# Patient Record
Sex: MALE | Race: WHITE | Employment: FULL TIME | URBAN - METROPOLITAN AREA
[De-identification: names, ages, dates, MRNs, and addresses within clinical notes are randomized per-mention and may not be internally consistent; named-entity substitution may affect disease eponyms.]

---

## 2017-03-29 ENCOUNTER — APPOINTMENT (OUTPATIENT)
Dept: OTHER | Facility: HOSPITAL | Age: 57
End: 2017-03-29
Attending: EMERGENCY MEDICINE

## 2017-10-12 ENCOUNTER — APPOINTMENT (OUTPATIENT)
Dept: GENERAL RADIOLOGY | Age: 57
End: 2017-10-12
Attending: NURSE PRACTITIONER
Payer: COMMERCIAL

## 2017-10-12 ENCOUNTER — HOSPITAL ENCOUNTER (OUTPATIENT)
Age: 57
Discharge: HOME | End: 2017-10-12
Payer: COMMERCIAL

## 2017-10-12 VITALS
BODY MASS INDEX: 37.03 KG/M2 | RESPIRATION RATE: 20 BRPM | WEIGHT: 250 LBS | SYSTOLIC BLOOD PRESSURE: 118 MMHG | DIASTOLIC BLOOD PRESSURE: 84 MMHG | HEIGHT: 69 IN | OXYGEN SATURATION: 98 % | HEART RATE: 88 BPM | TEMPERATURE: 98 F

## 2017-10-12 DIAGNOSIS — J40 BRONCHITIS: Primary | ICD-10-CM

## 2017-10-12 PROCEDURE — 99214 OFFICE O/P EST MOD 30 MIN: CPT

## 2017-10-12 PROCEDURE — 99204 OFFICE O/P NEW MOD 45 MIN: CPT

## 2017-10-12 PROCEDURE — 71020 XR CHEST PA + LAT CHEST (CPT=71020): CPT | Performed by: NURSE PRACTITIONER

## 2017-10-12 PROCEDURE — 94640 AIRWAY INHALATION TREATMENT: CPT

## 2017-10-12 RX ORDER — DOXYCYCLINE HYCLATE 100 MG/1
100 CAPSULE ORAL 2 TIMES DAILY
Qty: 20 CAPSULE | Refills: 0 | Status: SHIPPED | OUTPATIENT
Start: 2017-10-12 | End: 2017-10-22

## 2017-10-12 RX ORDER — PREDNISONE 20 MG/1
40 TABLET ORAL DAILY
Qty: 10 TABLET | Refills: 0 | Status: SHIPPED | OUTPATIENT
Start: 2017-10-12 | End: 2017-10-17

## 2017-10-12 RX ORDER — IPRATROPIUM BROMIDE AND ALBUTEROL SULFATE 2.5; .5 MG/3ML; MG/3ML
3 SOLUTION RESPIRATORY (INHALATION) ONCE
Status: COMPLETED | OUTPATIENT
Start: 2017-10-12 | End: 2017-10-12

## 2017-10-12 RX ORDER — ALBUTEROL SULFATE 90 UG/1
2 AEROSOL, METERED RESPIRATORY (INHALATION) EVERY 4 HOURS PRN
Qty: 1 INHALER | Refills: 0 | Status: SHIPPED | OUTPATIENT
Start: 2017-10-12 | End: 2017-11-11

## 2017-10-12 NOTE — ED INITIAL ASSESSMENT (HPI)
PATIENT WITH COUGH SINCE Saturday. REPORTS HE HAS FELT FEVERISH AT HOME. STATES COUGH IS PRODUCTIVE WITH YELLOW SPUTUM. DENIES HISTORY OF ASTHMA.

## 2017-10-12 NOTE — ED PROVIDER NOTES
Patient presents with:  Cough/URI      HPI:     Mckay Ortiz is a 61year old male who presents for evaluation and management of a chief complaint of productive cough with green and yellow sputum for the past 4 days.   The patient denies any chest pain or sh turbinates: clear drainage  THROAT: clear, without exudates, uvula midline and airway patent  LUNGS: End expiratory wheeze bilaterally. Nonlabored respirations. No rhonchi or rales.     MDM/Assessment/Plan:   Orders for this encounter:    Orders Placed Th today.    Follow Up with:  Mohit Paredes, MD [address] [address] [phone]    Schedule an appointment as soon as possible for a visit in 2 days

## 2018-05-30 PROCEDURE — 87086 URINE CULTURE/COLONY COUNT: CPT | Performed by: EMERGENCY MEDICINE

## 2018-05-31 ENCOUNTER — HOSPITAL ENCOUNTER (INPATIENT)
Facility: HOSPITAL | Age: 58
LOS: 6 days | Discharge: HOME | DRG: 194 | End: 2018-06-06
Attending: EMERGENCY MEDICINE | Admitting: HOSPITALIST
Payer: COMMERCIAL

## 2018-05-31 DIAGNOSIS — E87.1 HYPONATREMIA: ICD-10-CM

## 2018-05-31 DIAGNOSIS — R74.8 ELEVATED LIVER ENZYMES: ICD-10-CM

## 2018-05-31 DIAGNOSIS — J18.0 BRONCHOPNEUMONIA: Primary | ICD-10-CM

## 2018-05-31 PROCEDURE — 87449 NOS EACH ORGANISM AG IA: CPT | Performed by: EMERGENCY MEDICINE

## 2018-05-31 PROCEDURE — 87040 BLOOD CULTURE FOR BACTERIA: CPT | Performed by: EMERGENCY MEDICINE

## 2018-05-31 PROCEDURE — 83605 ASSAY OF LACTIC ACID: CPT | Performed by: EMERGENCY MEDICINE

## 2018-05-31 PROCEDURE — 81001 URINALYSIS AUTO W/SCOPE: CPT | Performed by: EMERGENCY MEDICINE

## 2018-05-31 PROCEDURE — 85025 COMPLETE CBC W/AUTO DIFF WBC: CPT | Performed by: EMERGENCY MEDICINE

## 2018-05-31 PROCEDURE — 80053 COMPREHEN METABOLIC PANEL: CPT | Performed by: EMERGENCY MEDICINE

## 2018-05-31 PROCEDURE — 99285 EMERGENCY DEPT VISIT HI MDM: CPT

## 2018-05-31 PROCEDURE — 94640 AIRWAY INHALATION TREATMENT: CPT

## 2018-05-31 PROCEDURE — 96375 TX/PRO/DX INJ NEW DRUG ADDON: CPT

## 2018-05-31 PROCEDURE — 36415 COLL VENOUS BLD VENIPUNCTURE: CPT

## 2018-05-31 PROCEDURE — 96367 TX/PROPH/DG ADDL SEQ IV INF: CPT

## 2018-05-31 PROCEDURE — 96365 THER/PROPH/DIAG IV INF INIT: CPT

## 2018-05-31 PROCEDURE — 84145 PROCALCITONIN (PCT): CPT | Performed by: EMERGENCY MEDICINE

## 2018-05-31 PROCEDURE — 96361 HYDRATE IV INFUSION ADD-ON: CPT

## 2018-05-31 RX ORDER — SODIUM CHLORIDE 9 MG/ML
INJECTION, SOLUTION INTRAVENOUS CONTINUOUS
Status: DISCONTINUED | OUTPATIENT
Start: 2018-06-01 | End: 2018-06-01

## 2018-05-31 RX ORDER — IBUPROFEN 800 MG/1
800 TABLET ORAL ONCE
Status: COMPLETED | OUTPATIENT
Start: 2018-05-31 | End: 2018-05-31

## 2018-05-31 RX ORDER — IPRATROPIUM BROMIDE AND ALBUTEROL SULFATE 2.5; .5 MG/3ML; MG/3ML
3 SOLUTION RESPIRATORY (INHALATION) ONCE
Status: COMPLETED | OUTPATIENT
Start: 2018-05-31 | End: 2018-05-31

## 2018-05-31 RX ORDER — TRAMADOL HYDROCHLORIDE 50 MG/1
50 TABLET ORAL ONCE
Status: COMPLETED | OUTPATIENT
Start: 2018-05-31 | End: 2018-05-31

## 2018-05-31 RX ORDER — ACETAMINOPHEN 500 MG
1000 TABLET ORAL EVERY 6 HOURS PRN
Status: DISCONTINUED | OUTPATIENT
Start: 2018-05-31 | End: 2018-06-01

## 2018-05-31 RX ORDER — CODEINE PHOSPHATE AND GUAIFENESIN 10; 100 MG/5ML; MG/5ML
5 SOLUTION ORAL ONCE
Status: COMPLETED | OUTPATIENT
Start: 2018-05-31 | End: 2018-05-31

## 2018-05-31 RX ORDER — METHYLPREDNISOLONE SODIUM SUCCINATE 40 MG/ML
40 INJECTION, POWDER, LYOPHILIZED, FOR SOLUTION INTRAMUSCULAR; INTRAVENOUS ONCE
Status: COMPLETED | OUTPATIENT
Start: 2018-05-31 | End: 2018-05-31

## 2018-05-31 NOTE — ED INITIAL ASSESSMENT (HPI)
Patient has a fever. Primary sent him to be admitted. Diagnosed yesterday and being treated for possible pneumonia.

## 2018-06-01 PROCEDURE — 87486 CHLMYD PNEUM DNA AMP PROBE: CPT | Performed by: NURSE PRACTITIONER

## 2018-06-01 PROCEDURE — 87633 RESP VIRUS 12-25 TARGETS: CPT | Performed by: NURSE PRACTITIONER

## 2018-06-01 PROCEDURE — 94640 AIRWAY INHALATION TREATMENT: CPT

## 2018-06-01 PROCEDURE — 87581 M.PNEUMON DNA AMP PROBE: CPT | Performed by: NURSE PRACTITIONER

## 2018-06-01 PROCEDURE — A4216 STERILE WATER/SALINE, 10 ML: HCPCS | Performed by: HOSPITALIST

## 2018-06-01 PROCEDURE — 87798 DETECT AGENT NOS DNA AMP: CPT | Performed by: NURSE PRACTITIONER

## 2018-06-01 PROCEDURE — 85025 COMPLETE CBC W/AUTO DIFF WBC: CPT | Performed by: HOSPITALIST

## 2018-06-01 PROCEDURE — 80048 BASIC METABOLIC PNL TOTAL CA: CPT | Performed by: HOSPITALIST

## 2018-06-01 PROCEDURE — 80076 HEPATIC FUNCTION PANEL: CPT | Performed by: NURSE PRACTITIONER

## 2018-06-01 RX ORDER — ACETAMINOPHEN 325 MG/1
650 TABLET ORAL EVERY 6 HOURS PRN
Status: DISCONTINUED | OUTPATIENT
Start: 2018-06-01 | End: 2018-06-06

## 2018-06-01 RX ORDER — ATORVASTATIN CALCIUM 40 MG/1
40 TABLET, FILM COATED ORAL DAILY
Status: DISCONTINUED | OUTPATIENT
Start: 2018-06-01 | End: 2018-06-01

## 2018-06-01 RX ORDER — IPRATROPIUM BROMIDE AND ALBUTEROL SULFATE 2.5; .5 MG/3ML; MG/3ML
3 SOLUTION RESPIRATORY (INHALATION) EVERY 4 HOURS PRN
Status: DISCONTINUED | OUTPATIENT
Start: 2018-06-01 | End: 2018-06-01

## 2018-06-01 RX ORDER — CODEINE PHOSPHATE AND GUAIFENESIN 10; 100 MG/5ML; MG/5ML
10 SOLUTION ORAL EVERY 6 HOURS PRN
Status: DISCONTINUED | OUTPATIENT
Start: 2018-06-01 | End: 2018-06-06

## 2018-06-01 RX ORDER — SODIUM CHLORIDE 0.9 % (FLUSH) 0.9 %
3 SYRINGE (ML) INJECTION AS NEEDED
Status: DISCONTINUED | OUTPATIENT
Start: 2018-06-01 | End: 2018-06-06

## 2018-06-01 RX ORDER — HEPARIN SODIUM 5000 [USP'U]/ML
5000 INJECTION, SOLUTION INTRAVENOUS; SUBCUTANEOUS EVERY 8 HOURS SCHEDULED
Status: DISCONTINUED | OUTPATIENT
Start: 2018-06-01 | End: 2018-06-06

## 2018-06-01 RX ORDER — ONDANSETRON 2 MG/ML
4 INJECTION INTRAMUSCULAR; INTRAVENOUS EVERY 6 HOURS PRN
Status: DISCONTINUED | OUTPATIENT
Start: 2018-06-01 | End: 2018-06-06

## 2018-06-01 RX ORDER — BISACODYL 10 MG
10 SUPPOSITORY, RECTAL RECTAL
Status: DISCONTINUED | OUTPATIENT
Start: 2018-06-01 | End: 2018-06-06

## 2018-06-01 RX ORDER — DOCUSATE SODIUM 100 MG/1
100 CAPSULE, LIQUID FILLED ORAL 2 TIMES DAILY
Status: DISCONTINUED | OUTPATIENT
Start: 2018-06-01 | End: 2018-06-06

## 2018-06-01 RX ORDER — METOCLOPRAMIDE HYDROCHLORIDE 5 MG/ML
10 INJECTION INTRAMUSCULAR; INTRAVENOUS EVERY 8 HOURS PRN
Status: DISCONTINUED | OUTPATIENT
Start: 2018-06-01 | End: 2018-06-06

## 2018-06-01 RX ORDER — POLYETHYLENE GLYCOL 3350 17 G/17G
17 POWDER, FOR SOLUTION ORAL DAILY PRN
Status: DISCONTINUED | OUTPATIENT
Start: 2018-06-01 | End: 2018-06-06

## 2018-06-01 RX ORDER — IPRATROPIUM BROMIDE AND ALBUTEROL SULFATE 2.5; .5 MG/3ML; MG/3ML
3 SOLUTION RESPIRATORY (INHALATION)
Status: DISCONTINUED | OUTPATIENT
Start: 2018-06-01 | End: 2018-06-04

## 2018-06-01 RX ORDER — SODIUM PHOSPHATE, DIBASIC AND SODIUM PHOSPHATE, MONOBASIC 7; 19 G/133ML; G/133ML
1 ENEMA RECTAL ONCE AS NEEDED
Status: DISCONTINUED | OUTPATIENT
Start: 2018-06-01 | End: 2018-06-06

## 2018-06-01 NOTE — PROGRESS NOTES
[facility] Pharmacy Note: Antimicrobial Weight Dose Adjustment for: {CefTRIAXone Sodium (ROCEPHIN) 1 g in sodium chloride 0.9 % 100 mL    Mckay Ortiz is a 61year old male who has been prescribed CefTRIAXone Sodium (ROCEPHIN) 1 g in sodium chloride 0.9 % 100 mL X

## 2018-06-01 NOTE — PLAN OF CARE
Problem: Patient Centered Care  Goal: Patient preferences are identified and integrated in the patient's plan of care  Interventions:  - What would you like us to know as we care for you?  Keep patient and wife involved in care  - Provide timely, complete, Anticipate increased pain with activity and pre-medicate as appropriate   Outcome: Progressing  patient denies pain for this RN    Problem: RISK FOR INFECTION - ADULT  Goal: Absence of fever/infection during anticipated neutropenic period  INTERVENTIONS  - ability or social support system   Outcome: Progressing  No home needs identified at this time.     Problem: SKIN/TISSUE INTEGRITY - ADULT  Goal: Skin integrity remains intact  INTERVENTIONS  - Assess and document risk factors for pressure ulcer development

## 2018-06-01 NOTE — CONSULTS
Pulmonary H&P/Consult     NAME: Bindu Guthrie - ROOM: 657/771-L - MRN: [id number] - Age: 61year old - :  10/20/1960    Date of Admission: 2018  6:55 PM  Admission Diagnosis: Hyponatremia [E87.1]  Bronchopneumonia [J18.0]    Assessment/Plan:  1.  Feve unspecified site    • Other and unspecified hyperlipidemia    Past Surgical History:  11/2015: COLONOSCOPY      Comment: adenomas, diverticulosis.  repeat 3 years  7/17/09: COLONOSCOPY,BIOPSY      Comment: Performed by Tara [facility] at [place] [address] tenderness or deformity. Heart: Regular rate and rhythm, normal S1S2, no murmur. Abdomen: soft, non-tender, non-distended, positive BS. Extremity: no edema    LABS/STUDIES: Reviewed in EPIC  Recent Labs   Lab  06/01/18   0529   RBC  3.86*   HGB  12. 3

## 2018-06-01 NOTE — H&P
[facility] Hospitalist Team  History and Physical     ASSESSMENT / PLAN:   46 yo male with hx obesity-BMI 44, seasonal allergies, HL, renal stone, recent tx for bronchitis/URI as well as  PNA with abnl CT chest who presents with ongoing cough, fever, malaise.  Pt bronchitis/URI as well as  PNA with abnl CT chest who presents with ongoing cough, fever, malaise. Pt states he has been feeling poorly since beginning of May.  He was having issues with productive cough and other URI like symptoms and was tx with 10 day Allergic rhinitis     hay fever   • Kidney stone    • Osteoarthrosis, unspecified whether generalized or localized, unspecified site    • Other and unspecified hyperlipidemia         PSH  Past Surgical History:  11/2015: COLONOSCOPY      Comment: adenomas, Onset   • Diabetes Father    • Hypertension Mother        Review of Systems  A comprehensive 10 point review of systems was completed. Pertinent positives and negatives noted in the the HPI.       DIAGNOSTIC DATA:   CBC/Chem  Recent Labs   Lab  05/30/18 Resp 18   Ht 5' 9\" (1.753 m)   Wt 264 lb 8.8 oz (120 kg)   SpO2 98%   BMI 39.07 kg/m²      Gen: No acute distress, alert and oriented x3  Neck Supple, no JVD  Pulm: Lungs rhonchi noted in lower left lung, some occasional wheezing  CV: Heart with regular

## 2018-06-01 NOTE — ED NOTES
Pt to ED via steady gait with c/o fever and respiratory issues since 05/04/2018. Pt states he has been seen multiple times for these issues- states he has been treated for pneumonia with antibiotics nut the fever keeps coming back.  Pt also had a CT of his

## 2018-06-01 NOTE — ED PROVIDER NOTES
Patient Seen in: [facility] AND [facility] Emergency Department    History   No chief complaint on file.     Stated Complaint: Fever     HPI    62 yo M with PMH allergic rhinitis, HL presenting for evaluation of nearly one month of intermittently productive cough/f hours as needed for Wheezing or Shortness of Breath.    predniSONE 20 MG Oral Tab,  Take 2 tablets by mouth daily for 5 days Followed by 1 tablet by mouth daily for 2 day Followed by 1/2 tablet by mouth daily for 2 days   atorvastatin 40 MG Oral Tab,  Take Result Value    WBC 12.9 (*)     RBC 4.18 (*)     HGB 13.3 (*)     HCT 38.3 (*)     MPV 6.8 (*)     Neutrophil Absolute 11.7 (*)     Lymphocyte Absolute 0.4 (*)     All other components within normal limits   CBC WITH DIFFERENTIAL WITH PLATELET    Jacob Ellison Follow-up study. Cough. Dyspnea. Vomiting. COMPARISON STUDY: CT abdomen/pelvis from earlier same day. TECHNIQUE:  Routine post-contrast thoracic CT was performed using 80 mL ISOVUE 370.  Additional 3D series, including MIP and MPR, were created and submitte the pubic symphysis. Neither oral or intravenous contrast are administered. Coronal and sagittal reformatted images of the abdomen and pelvis are also evaluated.  Automated exposure control and ALARA manual techniques for patient specific dose reduction wer to CAP, neoplasm, sepsis, bacteremia, UTI.     Pulse ox: 94%:Normal on RA, as interpreted by myself    Cardiac Monitor Interpretation: Pulse Readings from Last 1 Encounters:  05/31/18 : 97  , sinus,      Evaluation for ongoing cough now on second round of a

## 2018-06-01 NOTE — PLAN OF CARE
Problem: Patient Centered Care  Goal: Patient preferences are identified and integrated in the patient's plan of care  Interventions:  - What would you like us to know as we care for you? - Provide timely, complete, and accurate information to patient/fami anticipated neutropenic period  INTERVENTIONS  - Monitor WBC  - Administer growth factors as ordered  - Implement neutropenic guidelines   Outcome: Progressing  vss  Bolus given      Problem: SAFETY ADULT - FALL  Goal: Free from fall injury  INTERVENTIONS:

## 2018-06-01 NOTE — PAYOR COMM NOTE
--------------  ADMISSION REVIEW     Payor: [employer]  Subscriber #:  [id number]  Authorization Number: [id number]    Admit date: 5/31/18  Admit time: 2348         Patient Seen in: [facility] Emergency Department    History   No chief vomiting and abdominal pain. Genitourinary: Negative for dysuria and hematuria. Musculoskeletal: Negative for joint swelling and arthralgias. Skin: Negative for rash.   No itching      Positive for stated complaint: Fever    ED Triage Vitals [05/31/18 Shotty mediastinal and left hilar lymphadenopathy  -WBC 12.9-->10.4.  Tmax 103 overnight PCT negative  LA negative  -RVP   -blood cx pending  -legionella and strep negative  -zithromax and ceftriaxone  -duonebs scheduled  -steroids per pul  -oncology eval d it was more infectious but wanted him to see pulmonary and was scheduled as outpt for a visit today. While at home he had a temp of 103.9 which prompted him to come to the ER.       OBJECTIVE:  /85 (BP Location: Right arm)   Pulse 74   Temp 98.1 °F (3 by mouth daily.  Disp: 90 tablet Rfl: 3       DIAGNOSTIC DATA:   CBC/Chem  Recent Labs   Lab  05/30/18   1120  05/31/18 1953  06/01/18   0529   WBC  11.33  12.9*  10.4   HGB  15.0  13.3*  12.3*   MCV  90.1  91.8  92.4   PLT  231  246  215       Recent Lab UNIT/ML injection 5,000 Units     Date Action Dose Route     6/1/2018 0547 Given 5000 Units Subcutaneous (Right Lower Abdomen)       ibuprofen (MOTRIN) tab 800 mg     Date Action Dose Route     5/31/2018 1901 Given 800 mg Oral       ipratropium-albuterol (

## 2018-06-02 PROCEDURE — 94640 AIRWAY INHALATION TREATMENT: CPT

## 2018-06-02 PROCEDURE — 85025 COMPLETE CBC W/AUTO DIFF WBC: CPT | Performed by: NURSE PRACTITIONER

## 2018-06-02 PROCEDURE — 83735 ASSAY OF MAGNESIUM: CPT | Performed by: HOSPITALIST

## 2018-06-02 PROCEDURE — 80053 COMPREHEN METABOLIC PANEL: CPT | Performed by: HOSPITALIST

## 2018-06-02 PROCEDURE — A4216 STERILE WATER/SALINE, 10 ML: HCPCS | Performed by: HOSPITALIST

## 2018-06-02 PROCEDURE — 80053 COMPREHEN METABOLIC PANEL: CPT | Performed by: NURSE PRACTITIONER

## 2018-06-02 RX ORDER — MAGNESIUM OXIDE 400 MG (241.3 MG MAGNESIUM) TABLET
400 TABLET ONCE
Status: COMPLETED | OUTPATIENT
Start: 2018-06-02 | End: 2018-06-02

## 2018-06-02 RX ORDER — SODIUM CHLORIDE 9 MG/ML
INJECTION, SOLUTION INTRAVENOUS CONTINUOUS
Status: DISCONTINUED | OUTPATIENT
Start: 2018-06-02 | End: 2018-06-05

## 2018-06-02 NOTE — RESPIRATORY THERAPY NOTE
MOLLY ASSESSMENT:    Pt does not have a previous diagnosis of MOLLY. Pt does not routinely use a CPAP device at home. This pt is not suspected to be at high risk for MOLLY and sleep lab packet was not provided to patient for outpatient follow-up.       RCP recomm

## 2018-06-02 NOTE — PROGRESS NOTES
[facility] Hospitalist Progress Note     CC: Hospital Follow up    PCP: Sonam Severino MD       Assessment/Plan:     Principal Problem:    Bronchopneumonia  Active Problems:    Hyponatremia    44 yo male with hx obesity-BMI 39, seasonal allergies, HL, nora weight 264 lb 8.8 oz (120 kg), SpO2 94 %.     Temp:  [98.4 °F (36.9 °C)-103 °F (39.4 °C)] 98.4 °F (36.9 °C)  Pulse:  [77-96] 91  Resp:  [16-20] 16  BP: (138-146)/(68-80) 138/78      Intake/Output:    Intake/Output Summary (Last 24 hours) at 06/02/18 1301  L 5,000 Units Subcutaneous [facility]   • docusate sodium  100 mg Oral BID   • azithromycin  500 mg Intravenous Q24H   • cefTRIAXone  2 g Intravenous Q24H   • ipratropium-albuterol  3 mL Nebulization Q4H WA (5 times daily)     • sodium chloride 42 mL/hr at 06/0

## 2018-06-02 NOTE — PLAN OF CARE
Problem: Patient Centered Care  Goal: Patient preferences are identified and integrated in the patient's plan of care  Interventions:  - What would you like us to know as we care for you?  Keep patient and wife involved in care  - Provide timely, complete, pre-medicate as appropriate   Outcome: Progressing  Patient denies pain    Problem: RISK FOR INFECTION - ADULT  Goal: Absence of fever/infection during anticipated neutropenic period  INTERVENTIONS  - Monitor WBC  - Administer growth factors as ordered  -

## 2018-06-02 NOTE — PROGRESS NOTES
[facility] Pulmonary, Critical Care and Sleep    Lizy Dinero Patient Status:  Inpatient    10/20/1960 MRN [id number]   Location [facility] 5SW/SE Attending Luis Bowling MD   Hosp Day # 2 PCP Lauro Jaime MD       Date of Admission: 2018  6: 5509  06/02/18   0527  06/02/18   0718   GLU  146*  170*  112*  93   BUN  11  11  11  10   CREATSERUM  0.89  0.70  0.78  0.70   GFRAA  >60  >60  >60  >60   GFRNAA  >60  >60  >60  >60   CA  8.4*  8.0*  7.7*  8.2*   NA  127*  134*  124*  127*   K  4.4  4.6

## 2018-06-03 PROCEDURE — 87206 SMEAR FLUORESCENT/ACID STAI: CPT | Performed by: HOSPITALIST

## 2018-06-03 PROCEDURE — 85025 COMPLETE CBC W/AUTO DIFF WBC: CPT | Performed by: HOSPITALIST

## 2018-06-03 PROCEDURE — 87106 FUNGI IDENTIFICATION YEAST: CPT | Performed by: HOSPITALIST

## 2018-06-03 PROCEDURE — 86612 BLASTOMYCES ANTIBODY: CPT | Performed by: HOSPITALIST

## 2018-06-03 PROCEDURE — 87205 SMEAR GRAM STAIN: CPT | Performed by: INTERNAL MEDICINE

## 2018-06-03 PROCEDURE — 83735 ASSAY OF MAGNESIUM: CPT | Performed by: HOSPITALIST

## 2018-06-03 PROCEDURE — 84443 ASSAY THYROID STIM HORMONE: CPT | Performed by: NURSE PRACTITIONER

## 2018-06-03 PROCEDURE — 86698 HISTOPLASMA ANTIBODY: CPT | Performed by: HOSPITALIST

## 2018-06-03 PROCEDURE — 86635 COCCIDIOIDES ANTIBODY: CPT | Performed by: HOSPITALIST

## 2018-06-03 PROCEDURE — 94640 AIRWAY INHALATION TREATMENT: CPT

## 2018-06-03 PROCEDURE — 86606 ASPERGILLUS ANTIBODY: CPT | Performed by: HOSPITALIST

## 2018-06-03 PROCEDURE — 87102 FUNGUS ISOLATION CULTURE: CPT | Performed by: HOSPITALIST

## 2018-06-03 PROCEDURE — 87385 HISTOPLASMA CAPSUL AG IA: CPT | Performed by: INTERNAL MEDICINE

## 2018-06-03 PROCEDURE — A4216 STERILE WATER/SALINE, 10 ML: HCPCS | Performed by: HOSPITALIST

## 2018-06-03 PROCEDURE — 86641 CRYPTOCOCCUS ANTIBODY: CPT | Performed by: HOSPITALIST

## 2018-06-03 PROCEDURE — 80053 COMPREHEN METABOLIC PANEL: CPT | Performed by: HOSPITALIST

## 2018-06-03 RX ORDER — POTASSIUM CHLORIDE 20 MEQ/1
40 TABLET, EXTENDED RELEASE ORAL EVERY 4 HOURS
Status: COMPLETED | OUTPATIENT
Start: 2018-06-03 | End: 2018-06-03

## 2018-06-03 NOTE — CONSULTS
[facility]    PATIENT'S NAME: BALTA Zee   ATTENDING PHYSICIAN: Reymundo Chavez MD   CONSULTING PHYSICIAN: Kylah Hernandez MD   PATIENT ACCOUNT#:   [id number]    LOCATION:  [address] [place] [address] RECORD #:   [id number]       DATE OF BIRTH:  [birth year] man coughing, but in no acute distress. VITAL SIGNS:  He is afebrile at the present time. T-max was over 102 in the past 24 hours. Other vital signs stable. HEENT:  Normal.  NECK:  Supple. No JVD or adenopathy. HEART:  Normal S1, S2.   No S3, S4, murm over 101. If the temperature does go higher, then a bronchoscopy will be a recommendation for the morning. If the temperature stays low, then a more conservative management with continued antibiotics will be a consideration.       This was discussed at [facility]

## 2018-06-03 NOTE — PLAN OF CARE
Problem: Patient Centered Care  Goal: Patient preferences are identified and integrated in the patient's plan of care  Interventions:  - What would you like us to know as we care for you?  Keep patient and wife involved in care  - Provide timely, complete, FOR INFECTION - ADULT  Goal: Absence of fever/infection during anticipated neutropenic period  INTERVENTIONS  - Monitor WBC  - Administer growth factors as ordered  - Implement neutropenic guidelines   Outcome: Progressing      Problem: SAFETY ADULT - FALL of redness and/or skin breakdown  - Initiate interventions, skin care algorithm/standards of care as needed   Outcome: Progressing      Problem: RESPIRATORY - ADULT  Goal: Achieves optimal ventilation and oxygenation  INTERVENTIONS:  - Assess for changes i

## 2018-06-03 NOTE — PROGRESS NOTES
[facility] Hospitalist Progress Note     CC: Hospital Follow up    PCP: Anupam Prather MD       Assessment/Plan:     Principal Problem:    Bronchopneumonia  Active Problems:    Hyponatremia    46 yo male with hx obesity-BMI 39, seasonal allergies, HL, nora 9\" (1.753 m), weight 264 lb 8.8 oz (120 kg), SpO2 95 %.     Temp:  [97.8 °F (36.6 °C)-102.5 °F (39.2 °C)] 97.8 °F (36.6 °C)  Pulse:  [76-86] 76  Resp:  [16-18] 18  BP: (132-147)/(81-84) 132/81      Intake/Output:    Intake/Output Summary (Last 24 hours) at Sodium (Porcine)  5,000 Units Subcutaneous [facility]   • docusate sodium  100 mg Oral BID   • azithromycin  500 mg Intravenous Q24H   • cefTRIAXone  2 g Intravenous Q24H   • ipratropium-albuterol  3 mL Nebulization Q4H WA (5 times daily)     • sodium chlorid

## 2018-06-03 NOTE — PROGRESS NOTES
[facility] Pulmonary, Critical Care and Sleep    Yumiko Funez Patient Status:  Inpatient    10/20/1960 MRN [id number]   Location [facility] 5SW/SE Attending Yoli Sage MD   [facility] Day # 3 PCP Guido Hancock MD       Date of Admission: 2018  6: PLT  215  233  243     Recent Labs   Lab  06/01/18   0529  06/02/18   0527  06/02/18   0718  06/03/18   0507   GLU  170*  112*  93  145*   BUN  11  11  10  8   CREATSERUM  0.70  0.78  0.70  0.73   GFRAA  >60  >60  >60  >60   GFRNAA  >60  >60  >60  >60   CA

## 2018-06-03 NOTE — CONSULTS
Supriya Rios is a 61year old male. No chief complaint on file. HPI:    Ill since early may with cough and sinus congestion; fever this week    REVIEW OF SYSTEMS:   A comprehensive 11 point review of systems was completed.   Pertinent positives and ne BS present, No masses , rebound, no HSM. EXTREMITIES:  No edema, no clubbing, no cyanosis. NEURO:  No focal neurologic deficits. DERM:  Warm, dry, no rashes. IV Site: ok      IMPRESSION/PLAN:   1.  Cap and possible chronic pneumonitis; some evidence of 20.0   Calculated Osmolality 266 (L) 275 - 295 mOsm/kg   GFR, Non-African American >60 >=60   GFR, -American >60 >=60   -LACTIC ACID, PLASMA   Result Value Ref Range   Lactic Acid 1.5 0.5 - 2.2 mmol/L   -PROCALCITONIN   Result Value Ref Range   Proc Glucose 112 (H) 70 - 99 mg/dL   Sodium 124 (L) 136 - 144 mmol/L   Potassium  3.3 - 5.1 mmol/L   Chloride 93 (L) 95 - 110 mmol/L   CO2 21 (L) 22 - 32 mmol/L   BUN 11 8 - 20 mg/dL   Creatinine 0.78 0.50 - 1.50 mg/dL   Calcium, Total 7.7 (L) 8.5 - 10.5 mg/d Alkaline Phosphatase 86 32 - 100 U/L   Bilirubin, Total 0.6 0.3 - 1.2 mg/dL   Total Protein 6.0 5.9 - 8.4 g/dL   Albumin 2.6 (L) 3.5 - 4.8 g/dL   Globulin 3.4 2.5 - 3.7 g/dL   A/G Ratio 0.8 (L) 1.0 - 2.0   Anion Gap 7 0 - 18 mmol/L   BUN/CREA Ratio 11.0 K/UL   -CBC W/ DIFFERENTIAL   Result Value Ref Range   WBC 10.4 4.0 - 11.0 K/UL   RBC 3.86 (L) 4.50 - 5.90 M/UL   HGB 12.3 (L) 13.5 - 17.5 g/dL   HCT 35.6 (L) 41.0 - 52.0 %   MCV 92.4 80.0 - 100.0 fL   MCH 31.8 27.0 - 32.0 pg   MCHC 34.5 32.0 - 37.0 g/dl

## 2018-06-03 NOTE — PLAN OF CARE
Problem: Patient Centered Care  Goal: Patient preferences are identified and integrated in the patient's plan of care  Interventions:  - What would you like us to know as we care for you?  Keep patient and wife involved in care  - Provide timely, complete, headache    Problem: RISK FOR INFECTION - ADULT  Goal: Absence of fever/infection during anticipated neutropenic period  INTERVENTIONS  - Monitor WBC  - Administer growth factors as ordered  - Implement neutropenic guidelines   Outcome: Progressing  Contin document risk factors for pressure ulcer development  - Assess and document skin integrity  - Monitor for areas of redness and/or skin breakdown  - Initiate interventions, skin care algorithm/standards of care as needed   Outcome: Progressing      Problem:

## 2018-06-04 PROCEDURE — A4216 STERILE WATER/SALINE, 10 ML: HCPCS

## 2018-06-04 PROCEDURE — 80074 ACUTE HEPATITIS PANEL: CPT | Performed by: HOSPITALIST

## 2018-06-04 PROCEDURE — 82103 ALPHA-1-ANTITRYPSIN TOTAL: CPT | Performed by: INTERNAL MEDICINE

## 2018-06-04 PROCEDURE — 82104 ALPHA-1-ANTITRYPSIN PHENO: CPT | Performed by: INTERNAL MEDICINE

## 2018-06-04 PROCEDURE — 94640 AIRWAY INHALATION TREATMENT: CPT

## 2018-06-04 PROCEDURE — 83735 ASSAY OF MAGNESIUM: CPT | Performed by: HOSPITALIST

## 2018-06-04 PROCEDURE — 85025 COMPLETE CBC W/AUTO DIFF WBC: CPT | Performed by: HOSPITALIST

## 2018-06-04 PROCEDURE — 82784 ASSAY IGA/IGD/IGG/IGM EACH: CPT | Performed by: INTERNAL MEDICINE

## 2018-06-04 PROCEDURE — 80053 COMPREHEN METABOLIC PANEL: CPT | Performed by: HOSPITALIST

## 2018-06-04 RX ORDER — AZITHROMYCIN 250 MG/1
500 TABLET, FILM COATED ORAL EVERY 24 HOURS
Status: DISCONTINUED | OUTPATIENT
Start: 2018-06-04 | End: 2018-06-05

## 2018-06-04 RX ORDER — 0.9 % SODIUM CHLORIDE 0.9 %
VIAL (ML) INJECTION
Status: COMPLETED
Start: 2018-06-04 | End: 2018-06-04

## 2018-06-04 RX ORDER — AZITHROMYCIN 250 MG/1
500 TABLET, FILM COATED ORAL EVERY 24 HOURS
Status: DISCONTINUED | OUTPATIENT
Start: 2018-06-04 | End: 2018-06-04

## 2018-06-04 RX ORDER — IPRATROPIUM BROMIDE AND ALBUTEROL SULFATE 2.5; .5 MG/3ML; MG/3ML
3 SOLUTION RESPIRATORY (INHALATION)
Status: DISCONTINUED | OUTPATIENT
Start: 2018-06-04 | End: 2018-06-06

## 2018-06-04 NOTE — PAYOR COMM NOTE
REF: [id number]  APPROVED 5/31/18 - 6/3/18- REQUESTING ADDITIONAL DAYS      6/3/18  LLL Consolidation-PNA vs Ca  -5/4 given cefdinir for URI, 5/21 gvien medrol dose pack and breo, seen in [facility] 5/30 given doxy, cefdinir, albuterol and prednisone  -5/30 CXR-Mild CO2  23  21*  26  26                 Recent Labs   Lab  05/31/18   1953  06/01/18   0529  06/02/18   0527  06/02/18   0718  06/03/18   0507   ALT  112*  94*  112*  125*  114*   AST  94*  62*  79*  80*  71*   ALB  3.2*  2.6*  2.7*  2.9*  2.6*      Meds: coarse      SUBJECTIVE:   Up in chair. No further fevers. Cough still present with blood tinged sputum. Feels like breathing tx help with decreasing wheezing in chest, no current wheezing.  Nervous about going home as he is afraid he will have fevers       Units 5,000 Units Subcutaneous Q8H [address]   acetaminophen (TYLENOL) tab 650 mg 650 mg Oral Q6H PRN   docusate sodium (COLACE) cap 100 mg 100 mg Oral BID   PEG 3350 (MIRALAX) powder packet 17 g 17 g Oral Daily PRN   magnesium hydroxide (MILK OF MAGNESIA) 400 M

## 2018-06-04 NOTE — CONSULTS
REFERRING PHYSICIAN: Dr. Robledo ref. provider found    HPI:         Thank you very much for requesting me to see the patient. As you know, Louann Rios is a 61year old male who is presently admitted since 5/31/18 with 1 mo of productive cough/fever.  s/p cef Component      Latest Ref Rng & Units 6/1/2018 5/31/2018 5/30/2018 5/4/2018                ALT (SGPT)      17 - 63 U/L 94 (H) 112 (H) 48 45   AST (SGOT)      15 - 41 U/L 62 (H) 94 (H) 22 26   ALKALINE PHOSPHATASE      32 - 100 U/L 75 88 94 112   Total suspicious lesions  HEENT: anicteric; no JVD. NECK: supple, no adenopathy, no bruits  LUNGS: left rales mid/lower lung. CARDIO: RRR, nl s1 and s2. No murmers appreciated. GI: Soft, NT, ND, normal BS. NO HSM, masses, hernias or bruits.   EXTREMITIES: no

## 2018-06-04 NOTE — PROGRESS NOTES
Radha Cavazos is a 61year old male. No chief complaint on file. HPI:    Pain better  Temp better  Ongoing blood tinged sputum    REVIEW OF SYSTEMS:   A comprehensive 11 point review of systems was completed.   Pertinent positives and negatives noted i No masses , rebound, no HSM. EXTREMITIES:  No edema, no clubbing, no cyanosis. NEURO:  No focal neurologic deficits. DERM:  Warm, dry, no rashes.   IV Site: ok      IMPRESSION/PLAN:     PHYSICAL EXAM:   /81 (BP Location: Right arm)   Pulse 76   Tem CULTURE AND SMEAR In process    FUNGUS CULTURE(P) In process    FUNGUS STAIN In process    HEPATITIS PANEL, ACUTE (4) In process    HISTOPLASMA ANTIGEN, URINE In process    BLOOD CULTURE Preliminary result    BLOOD CULTURE Preliminary result             Re Urine Negative Negative mg/dL   WBC Urine <1 0 - 5 /HPF   RBC URINE 1 0 - 3 /HPF   Bacteria Urine Few (A) None Seen /HPF   -BASIC METABOLIC PANEL (8)   Result Value Ref Range   Glucose 170 (H) 70 - 99 mg/dL   Sodium 134 (L) 136 - 144 mmol/L   Potassium 4.6 mg/dL   Creatinine 0.70 0.50 - 1.50 mg/dL   Calcium, Total 8.2 (L) 8.5 - 10.5 mg/dL    (H) 17 - 63 U/L   AST 80 (H) 15 - 41 U/L   Alkaline Phosphatase 87 32 - 100 U/L   Bilirubin, Total 0.6 0.3 - 1.2 mg/dL   Total Protein 6.5 5.9 - 8.4 g/dL   Albumi Ratio 0.8 (L) 1.0 - 2.0   Anion Gap 5 0 - 18 mmol/L   BUN/CREA Ratio 8.6 (L) 10.0 - 20.0   Calculated Osmolality 274 (L) 275 - 295 mOsm/kg   GFR, Non-African American >60 >=60   GFR, -American >60 >=60   -TSH W REFLEX TO FREE T4   Result Value Ref R 0.9 0.0 - 1.0 K/UL   Eosinophil Absolute 0.0 0.0 - 0.7 K/UL   Basophil Absolute 0.0 0.0 - 0.2 K/UL   -CBC W/ DIFFERENTIAL   Result Value Ref Range   WBC 10.4 4.0 - 11.0 K/UL   RBC 3.86 (L) 4.50 - 5.90 M/UL   HGB 12.3 (L) 13.5 - 17.5 g/dL   HCT 35.6 (L) 41. Basophil Absolute 0.0 0.0 - 0.2 K/UL   -CBC W/ DIFFERENTIAL   Result Value Ref Range   WBC 6.4 4.0 - 11.0 K/UL   RBC 3.90 (L) 4.50 - 5.90 M/UL   HGB 12.6 (L) 13.5 - 17.5 g/dL   HCT 35.8 (L) 41.0 - 52.0 %   MCV 91.7 80.0 - 100.0 fL   MCH 32.4 (H) 27.0 - 3

## 2018-06-04 NOTE — PROGRESS NOTES
[facility] Hospitalist Team  Progress Note    Kayla Edwards Patient Status:  Inpatient    10/20/1960 MRN [id number]   [facility] 5SW/SE Attending Joseph Bae MD   Hosp Day # 4 PCP Urban Campos MD     CC: Follow Up  PCP: RONI Rios    Prophy  -SCD  -heparin     Dispo  -pending clinical coarse  Wife Denny Murdock [phone], update given 6/4 via phone  PCP: Marya Heredia MD-has appt on Friday 6/8    Concerns regarding plan of care were discussed with patient.  Patient agree 159*   AST  62*  79*  80*  71*  89*   ALB  2.6*  2.7*  2.9*  2.6*  2.7*       No results for input(s): PGLU in the last 168 hours. No results for input(s): TROP in the last 168 hours.         MEDICATIONS        Current Facility-Administered Medications: medication SE  -GI consulted    Rest of plan per NP note above    Babak Galicia MD

## 2018-06-04 NOTE — PLAN OF CARE
Problem: Patient Centered Care  Goal: Patient preferences are identified and integrated in the patient's plan of care  Interventions:  - What would you like us to know as we care for you?  Keep patient and wife involved in care  - Provide timely, complete, Monitor WBC  - Administer growth factors as ordered  - Implement neutropenic guidelines   Outcome: Progressing      Problem: SAFETY ADULT - FALL  Goal: Free from fall injury  INTERVENTIONS:  - Assess pt frequently for physical needs  - Identify cognitive a Outcome: Progressing      Problem: RESPIRATORY - ADULT  Goal: Achieves optimal ventilation and oxygenation  INTERVENTIONS:  - Assess for changes in respiratory status  - Assess for changes in mentation and behavior  - Position to facilitate oxygenation a

## 2018-06-04 NOTE — PLAN OF CARE
Problem: Patient/Family Goals  Goal: Patient/Family Long Term Goal  Patient's Long Term Goal: return home with wife    Interventions:  - pulmonary consult  -antibiotics  - See additional Care Plan goals for specific interventions    Outcome: Progressing  P socks on. Pt calls appropriately for help. Frequent rounding by nursing staff.      Problem: SKIN/TISSUE INTEGRITY - ADULT  Goal: Skin integrity remains intact  INTERVENTIONS  - Assess and document risk factors for pressure ulcer development  - Assess and d

## 2018-06-04 NOTE — PROGRESS NOTES
[facility] Pharmacy Note: Route Optimization for Azithromycin [facility])    Patient is currently on Azithromycin (ZITHROMAX) 500 mg IV every 24 hours.    The patient meets the criteria to convert to the oral equivalent as established by the IV to Oral conversion

## 2018-06-04 NOTE — PROGRESS NOTES
Pulmonary Progress Note     Assessment / Plan:  1. Abnormal imaging - symptoms suggestive of an infectious process, but imaging atypical and raises the possibility of malignancy. PCT not elevated  - on RA   - f/u CT in 6-8 weeks  - bd protocol  2.  ID: John

## 2018-06-05 PROCEDURE — 83010 ASSAY OF HAPTOGLOBIN QUANT: CPT | Performed by: INTERNAL MEDICINE

## 2018-06-05 PROCEDURE — 83516 IMMUNOASSAY NONANTIBODY: CPT | Performed by: INTERNAL MEDICINE

## 2018-06-05 PROCEDURE — 81256 HFE GENE: CPT | Performed by: INTERNAL MEDICINE

## 2018-06-05 PROCEDURE — 82104 ALPHA-1-ANTITRYPSIN PHENO: CPT | Performed by: INTERNAL MEDICINE

## 2018-06-05 PROCEDURE — 80076 HEPATIC FUNCTION PANEL: CPT | Performed by: INTERNAL MEDICINE

## 2018-06-05 PROCEDURE — 85025 COMPLETE CBC W/AUTO DIFF WBC: CPT | Performed by: NURSE PRACTITIONER

## 2018-06-05 PROCEDURE — 82550 ASSAY OF CK (CPK): CPT | Performed by: INTERNAL MEDICINE

## 2018-06-05 PROCEDURE — 94640 AIRWAY INHALATION TREATMENT: CPT

## 2018-06-05 PROCEDURE — 82103 ALPHA-1-ANTITRYPSIN TOTAL: CPT | Performed by: INTERNAL MEDICINE

## 2018-06-05 PROCEDURE — 83615 LACTATE (LD) (LDH) ENZYME: CPT | Performed by: INTERNAL MEDICINE

## 2018-06-05 PROCEDURE — 82784 ASSAY IGA/IGD/IGG/IGM EACH: CPT | Performed by: INTERNAL MEDICINE

## 2018-06-05 PROCEDURE — 80048 BASIC METABOLIC PNL TOTAL CA: CPT | Performed by: NURSE PRACTITIONER

## 2018-06-05 PROCEDURE — 82787 IGG 1 2 3 OR 4 EACH: CPT | Performed by: INTERNAL MEDICINE

## 2018-06-05 PROCEDURE — A4216 STERILE WATER/SALINE, 10 ML: HCPCS | Performed by: HOSPITALIST

## 2018-06-05 NOTE — PROGRESS NOTES
Pulmonary Progress Note      NAME: Jaci Markus - ROOM: 75/938-N - MRN: [id number] - Age: 61year old - : 10/20/1960    Assessment/Plan:  1.  Abnormal imaging - symptoms suggestive of an infectious process, but imaging is atypical and raises the possibil Labs   Lab  06/05/18   0516   RBC  3.93*   HGB  12.6*   HCT  36.6*   MCV  93.1   MCH  32.1*   MCHC  34.5   RDW  14.4   WBC  5.6   PLT  353     Recent Labs   Lab  06/03/18   0507  06/04/18   0535  06/05/18   0516   GLU  145*  102*  103*   BUN  8  6*  10   C

## 2018-06-05 NOTE — DISCHARGE SUMMARY
[facility] Hospitalist Discharge Summary   Patient ID:  Benedict Gaming  [id number]  3 year old  10/20/1960    Admit date: 5/31/2018  Discharge date: 6/6/2018    Primary Care Physician: Sarai Jacobo MD   Attending Physician: Liliya Almaraz MD   Consults: having issues with frequency and did have one night of lose of urine while sleeping.  Work up in the [facility] determined he had a LLL consolidation concerning for PNA vs TERA, he was placed on doxy and omnicef and went to see oncology yesterday who told him they  ALT up to 223. Alk phos up to 223  -CT A/P- liver was normal  -US GB-elements of hepatic steatosis, no hepatobiliary dilation, no evidence cholelithiasis or acute cholecystitis. -hold statin  -?  Due to abx-abx changed per ID to doxy  -acute hepatitis pan Friday with Dr Harjinder Guerra for now with elevated liver function tests    Finish antibiotics as ordered    Follow up with pulmonary Dr Jinny Lam next week, will need follow up imaging of the chest in several weeks    Follow up with GI about liver

## 2018-06-05 NOTE — PLAN OF CARE
Problem: Patient/Family Goals  Goal: Patient/Family Long Term Goal  Patient's Long Term Goal: return home with wife    Interventions:  - pulmonary consult  -antibiotics  - See additional Care Plan goals for specific interventions    Outcome: Progressing  P within reach. Nonslip socks on. Pt calls appropriately for help. Frequent rounding by nursing staff.      Problem: SKIN/TISSUE INTEGRITY - ADULT  Goal: Skin integrity remains intact  INTERVENTIONS  - Assess and document risk factors for pressure ulcer devel

## 2018-06-05 NOTE — PLAN OF CARE
Problem: Patient Centered Care  Goal: Patient preferences are identified and integrated in the patient's plan of care  Interventions:  - What would you like us to know as we care for you?  Keep patient and wife involved in care  - Provide timely, complete, Monitor WBC  - Administer growth factors as ordered  - Implement neutropenic guidelines   Outcome: Progressing      Problem: SAFETY ADULT - FALL  Goal: Free from fall injury  INTERVENTIONS:  - Assess pt frequently for physical needs  - Identify cognitive a Outcome: Progressing

## 2018-06-05 NOTE — PROGRESS NOTES
[facility] Infectious Disease Progress Note    Sheng Stain Patient Status:  Inpatient    10/20/1960 MRN [id number]   [facility] 5SW/SE Attending Yaakov Franco MD   Hosp Day # 5 PCP Lydia Guillory MD     Subjective: °C), Min:97.6 °F (36.4 °C), Max:97.8 °F (36.6 °C)      HEENT: Exam is unremarkable. Without scleral icterus. Mucous membranes are moist. PERRLA. Oropharynx is clear. Neck: No tenderness to palpitation.   Full range of motion to flexion and extension, la patient, Will follow,  If you have any questions or concerns please call [facility] at [phone].      Edwige Velazquez MD  6/5/2018  1:55 PM

## 2018-06-05 NOTE — PROGRESS NOTES
GI  PROGRESS NOTE    SUBJECTIVE: relates feels better; tolerating diet.        OBJECTIVE:  Temp:  [97.6 °F (36.4 °C)-97.8 °F (36.6 °C)] 97.8 °F (36.6 °C)  Pulse:  [71-84] 84  Resp:  [18-20] 20  BP: (127-136)/(70-90) 136/90  Exam  Gen: No acute distress, a cough/hemoptysis/6.6 x 5.0 x 7.0 cm LLL consolidation. Rising ast/alt/alk phos. Hyponatremia. (-) acute hepatitis panel. Recent medrol dose pack. LFT's previously normal. Continued rise in LFT's. Suspect drug induced liver injury.  Azithromycin has been hel

## 2018-06-05 NOTE — PROGRESS NOTES
[facility] Hospitalist Team  Progress Note    Jeanne Diaz Patient Status:  Inpatient    10/20/1960 MRN [id number]   [facility] 5SW/SE Attending Aurea Gonzales MD   Hosp Day # 5 PCP Kevin Sloan MD     CC: Follow Up  PCP: RONI Blackwell pending for work up of underlying chronic liver dz Vs paraneoplastic phenomenon   -as per GI     HL  -hold statin for now with increased LFT'S     FEN  -lytes in am  -diet-general      Prophy  -SCD  -heparin     Dispo  -home when LFT's down trending and ok 185*   AST  79*  80*  71*  89*  105*   ALB  2.7*  2.9*  2.6*  2.7*  2.8*   LDH   --    --    --    --   268*       No results for input(s): PGLU in the last 168 hours. No results for input(s): TROP in the last 168 hours.         MEDICATIONS        Curren from Abx, now adjusted as discussed above  -GI consulted, appreciate.  Lab workup pending     Rest of plan per NP note above     Lilibeth Rhodes MD

## 2018-06-06 ENCOUNTER — APPOINTMENT (OUTPATIENT)
Dept: ULTRASOUND IMAGING | Facility: HOSPITAL | Age: 58
DRG: 194 | End: 2018-06-06
Attending: INTERNAL MEDICINE
Payer: COMMERCIAL

## 2018-06-06 VITALS
HEIGHT: 69 IN | SYSTOLIC BLOOD PRESSURE: 126 MMHG | OXYGEN SATURATION: 96 % | WEIGHT: 268.5 LBS | TEMPERATURE: 98 F | BODY MASS INDEX: 39.77 KG/M2 | RESPIRATION RATE: 18 BRPM | DIASTOLIC BLOOD PRESSURE: 78 MMHG | HEART RATE: 79 BPM

## 2018-06-06 PROCEDURE — 94640 AIRWAY INHALATION TREATMENT: CPT

## 2018-06-06 PROCEDURE — 76705 ECHO EXAM OF ABDOMEN: CPT | Performed by: INTERNAL MEDICINE

## 2018-06-06 PROCEDURE — 85025 COMPLETE CBC W/AUTO DIFF WBC: CPT | Performed by: NURSE PRACTITIONER

## 2018-06-06 PROCEDURE — 80053 COMPREHEN METABOLIC PANEL: CPT | Performed by: NURSE PRACTITIONER

## 2018-06-06 RX ORDER — DOXYCYCLINE HYCLATE 100 MG/1
100 CAPSULE ORAL 2 TIMES DAILY
Qty: 20 CAPSULE | Refills: 0 | Status: SHIPPED | OUTPATIENT
Start: 2018-06-06 | End: 2018-06-06

## 2018-06-06 RX ORDER — DOXYCYCLINE 100 MG/1
100 CAPSULE ORAL EVERY 12 HOURS SCHEDULED
Status: DISCONTINUED | OUTPATIENT
Start: 2018-06-06 | End: 2018-06-06

## 2018-06-06 RX ORDER — DOXYCYCLINE 100 MG/1
100 CAPSULE ORAL EVERY 12 HOURS SCHEDULED
Qty: 1 CAPSULE | Refills: 0 | Status: SHIPPED | OUTPATIENT
Start: 2018-06-06 | End: 2018-06-06

## 2018-06-06 RX ORDER — DOXYCYCLINE HYCLATE 100 MG/1
100 CAPSULE ORAL 2 TIMES DAILY
Qty: 20 CAPSULE | Refills: 0 | Status: SHIPPED | OUTPATIENT
Start: 2018-06-06 | End: 2018-06-16

## 2018-06-06 NOTE — PROGRESS NOTES
GI  PROGRESS NOTE    SUBJECTIVE: relates feels better; tolerating diet. Cough persists.      OBJECTIVE:  Temp:  [97.3 °F (36.3 °C)-97.9 °F (36.6 °C)] 97.9 °F (36.6 °C)  Pulse:  [71-83] 83  Resp:  [18] 18  BP: (122-144)/(79-89) 140/83  Exam  Gen: No acute consolidation. Hyponatremia. (-) acute hepatitis panel. Recent medrol dose pack. LFT's previously normal.   Azithromycin has been d/c'd (ceftriaxone typically causes cholestatic pattern). Low serum IgA level.  Continued rise in LFT's: suspect DILI vs relat

## 2018-06-06 NOTE — PLAN OF CARE
Problem: Patient Centered Care  Goal: Patient preferences are identified and integrated in the patient's plan of care  Interventions:  - What would you like us to know as we care for you?  Keep patient and wife involved in care  - Provide timely, complete, anticipated neutropenic period  INTERVENTIONS  - Monitor WBC  - Administer growth factors as ordered  - Implement neutropenic guidelines   Outcome: Adequate for Discharge      Problem: SAFETY ADULT - FALL  Goal: Free from fall injury  INTERVENTIONS:  - Ass breakdown  - Initiate interventions, skin care algorithm/standards of care as needed   Outcome: Adequate for Discharge      Problem: RESPIRATORY - ADULT  Goal: Achieves optimal ventilation and oxygenation  INTERVENTIONS:  - Assess for changes in respirator

## 2018-06-06 NOTE — PROGRESS NOTES
Pulmonary Progress Note     Assessment / Plan:  1. Abnormal imaging - symptoms suggestive of an infectious process, but imaging is atypical and raises the possibility of malignancy. PCT not elevated  - on RA   - f/u CT in 6-8 weeks  - bd protocol  2.  ID -

## 2018-06-06 NOTE — PROGRESS NOTES
[facility] AND [facility] Infectious Disease Progress Note    Inez Given Patient Status:  Inpatient    10/20/1960 MRN [id number]   [facility] 5SW/SE Attending Louann Ansari MD   Hosp Day # 6 PCP Melania Montoya MD     Subjective: (36.6 °C)      HEENT: Exam is unremarkable. Without scleral icterus. Mucous membranes are moist. PERRLA. Oropharynx is clear. Neck: No tenderness to palpitation.   Full range of motion to flexion and extension, lateral rotation and lateral flexion of ce although I dont feel his LFTs are sec to Ceftriaxone,   - discussed with GI too,     At this point, will dc IV Ceftriaxone, off of azithromycin already, LFts trend still upwards, no tenderness RUQ, iatrogenic? ? May take a week or so to start showing improv

## 2018-06-08 ENCOUNTER — TELEPHONE (OUTPATIENT)
Dept: CARDIOLOGY UNIT | Facility: HOSPITAL | Age: 58
End: 2018-06-08

## 2018-06-12 ENCOUNTER — TELEPHONE (OUTPATIENT)
Dept: CARDIOLOGY UNIT | Facility: HOSPITAL | Age: 58
End: 2018-06-12

## 2018-06-14 ENCOUNTER — OFFICE VISIT (OUTPATIENT)
Dept: CARDIOLOGY CLINIC | Facility: HOSPITAL | Age: 58
End: 2018-06-14
Attending: NURSE PRACTITIONER
Payer: COMMERCIAL

## 2018-06-14 VITALS
OXYGEN SATURATION: 98 % | BODY MASS INDEX: 39 KG/M2 | WEIGHT: 262.69 LBS | SYSTOLIC BLOOD PRESSURE: 137 MMHG | HEART RATE: 94 BPM | DIASTOLIC BLOOD PRESSURE: 82 MMHG

## 2018-06-14 DIAGNOSIS — J18.9 PNA (PNEUMONIA): ICD-10-CM

## 2018-06-14 DIAGNOSIS — J18.9 COMMUNITY ACQUIRED PNEUMONIA OF LEFT LOWER LOBE OF LUNG: Primary | ICD-10-CM

## 2018-06-14 PROBLEM — E78.49 OTHER HYPERLIPIDEMIA: Chronic | Status: ACTIVE | Noted: 2018-06-14

## 2018-06-14 PROCEDURE — 99214 OFFICE O/P EST MOD 30 MIN: CPT | Performed by: NURSE PRACTITIONER

## 2018-06-14 PROCEDURE — 94618 PULMONARY STRESS TESTING: CPT | Performed by: NURSE PRACTITIONER

## 2018-06-14 PROCEDURE — 99211 OFF/OP EST MAY X REQ PHY/QHP: CPT | Performed by: NURSE PRACTITIONER

## 2018-06-14 PROCEDURE — 36415 COLL VENOUS BLD VENIPUNCTURE: CPT | Performed by: NURSE PRACTITIONER

## 2018-06-14 PROCEDURE — 85025 COMPLETE CBC W/AUTO DIFF WBC: CPT | Performed by: NURSE PRACTITIONER

## 2018-06-14 PROCEDURE — 80053 COMPREHEN METABOLIC PANEL: CPT | Performed by: NURSE PRACTITIONER

## 2018-06-14 NOTE — PATIENT INSTRUCTIONS
Continue incentive spirometer 4 sets of 10 breaths for the next 5 days.     Continue all your same medications    Call if having any dizziness, lightheadedness, heart racing, palpitations, chest pain, shortness of breath, coughing, swelling, weight gain, fe

## 2018-06-14 NOTE — PROGRESS NOTES
[facility]    Dennis Addison Patient Status:  No patient class for patient encounter    10/20/1960 MRN [id number]   Location Alvina Higginbotham MD  [facility], MD Dennis Addison is a 61 year o 08:32 AM   BILT 0.8 06/14/2018 08:32 AM   TP 7.3 06/14/2018 08:32 AM   AST 24 06/14/2018 08:32 AM   ALT 64 (H) 06/14/2018 08:32 AM   INR 1.1 06/08/2018 02:05 PM   TSH 3.02 06/03/2018 02:09 PM   LIP 61 (L) 05/30/2018 11:20 AM   PSA 1.56 11/25/2017 09:18 AM obesity BMI 38   Elevated LFT's  HL - statin on hold      Decision Making:   Here for hospital follow up for community acquired pneumonia with LLL consolidation consistent with pneumonia , sputum culture positive for candida, likely colonized.  Treated with like walking for about 30 minutes 5 days per week. Start by walking at a slow to moderate pace for 5-10 minutes 2-3 times a day. Pace your activity to prevent shortness of breath or fatigue.  Stop exercise if you develop chest pain, lightheadedness, or sign

## 2018-07-26 PROBLEM — J18.0 BRONCHOPNEUMONIA: Status: RESOLVED | Noted: 2018-05-31 | Resolved: 2018-07-26

## 2018-07-26 PROBLEM — E87.1 HYPONATREMIA: Status: RESOLVED | Noted: 2018-05-31 | Resolved: 2018-07-26

## 2018-07-26 PROBLEM — K76.0 NAFLD (NONALCOHOLIC FATTY LIVER DISEASE): Status: ACTIVE | Noted: 2018-07-26

## 2018-11-26 PROCEDURE — 88305 TISSUE EXAM BY PATHOLOGIST: CPT | Performed by: INTERNAL MEDICINE

## 2019-09-23 PROBLEM — J18.9 COMMUNITY ACQUIRED PNEUMONIA OF LEFT LOWER LOBE OF LUNG: Chronic | Status: RESOLVED | Noted: 2018-06-14 | Resolved: 2019-09-23

## 2019-12-16 PROBLEM — E55.9 VITAMIN D DEFICIENCY: Status: ACTIVE | Noted: 2019-12-16

## 2019-12-16 PROBLEM — R73.03 PREDIABETES: Status: ACTIVE | Noted: 2019-12-16

## 2019-12-16 PROBLEM — R79.82 ELEVATED HIGH SENSITIVITY C-REACTIVE PROTEIN: Status: ACTIVE | Noted: 2019-12-16

## 2020-09-24 PROBLEM — E78.49 OTHER HYPERLIPIDEMIA: Chronic | Status: RESOLVED | Noted: 2018-06-14 | Resolved: 2020-09-24

## 2020-09-24 PROBLEM — R79.82 ELEVATED HIGH SENSITIVITY C-REACTIVE PROTEIN: Status: RESOLVED | Noted: 2019-12-16 | Resolved: 2020-09-24

## 2020-09-24 PROBLEM — E78.2 MIXED HYPERLIPIDEMIA: Status: ACTIVE | Noted: 2018-06-14

## 2021-01-28 PROBLEM — U07.1 COVID-19 VIRUS INFECTION: Status: ACTIVE | Noted: 2021-01-18

## 2021-01-28 PROBLEM — U07.1 COVID-19 VIRUS INFECTION: Status: ACTIVE | Noted: 2021-01-28

## 2021-06-10 ASSESSMENT — ACTIVITIES OF DAILY LIVING (ADL)
ADL_BEFORE_ADMISSION: INDEPENDENT
ADL_SCORE: 12
SENSORY_SUPPORT_DEVICES: HEARING AIDS;EYEGLASSES

## 2021-06-14 ENCOUNTER — HOSPITAL ENCOUNTER (OUTPATIENT)
Dept: LAB | Age: 61
Discharge: HOME | End: 2021-06-14
Attending: SURGERY

## 2021-06-14 DIAGNOSIS — Z01.812 PRE-PROCEDURAL LABORATORY EXAMINATIONS: ICD-10-CM

## 2021-06-14 LAB
SARS-COV-2 RNA RESP QL NAA+PROBE: NOT DETECTED
SERVICE CMNT-IMP: NORMAL
SERVICE CMNT-IMP: NORMAL

## 2021-06-14 PROCEDURE — U0005 INFEC AGEN DETEC AMPLI PROBE: HCPCS | Performed by: SURGERY

## 2021-06-17 ENCOUNTER — ANESTHESIA EVENT (OUTPATIENT)
Dept: SURGERY | Age: 61
End: 2021-06-17

## 2021-06-17 ENCOUNTER — HOSPITAL ENCOUNTER (OUTPATIENT)
Age: 61
Discharge: HOME | End: 2021-06-17
Attending: SURGERY | Admitting: SURGERY

## 2021-06-17 ENCOUNTER — ANESTHESIA (OUTPATIENT)
Dept: SURGERY | Age: 61
End: 2021-06-17

## 2021-06-17 VITALS
DIASTOLIC BLOOD PRESSURE: 78 MMHG | TEMPERATURE: 97 F | OXYGEN SATURATION: 96 % | RESPIRATION RATE: 16 BRPM | HEIGHT: 69 IN | BODY MASS INDEX: 33.76 KG/M2 | SYSTOLIC BLOOD PRESSURE: 129 MMHG | WEIGHT: 227.96 LBS | HEART RATE: 58 BPM

## 2021-06-17 DIAGNOSIS — Z01.812 PRE-PROCEDURAL LABORATORY EXAMINATIONS: Primary | ICD-10-CM

## 2021-06-17 PROCEDURE — 10004451 HB PACU RECOVERY 1ST 30 MINUTES: Performed by: SURGERY

## 2021-06-17 PROCEDURE — 13000002 HB ANESTHESIA  GENERAL  S/U + 1ST 15 MIN: Performed by: SURGERY

## 2021-06-17 PROCEDURE — 10004452 HB PACU ADDL 30 MINUTES: Performed by: SURGERY

## 2021-06-17 PROCEDURE — 13000099 HB GENERAL ROBOTIC CASE EA ADD MINUTE: Performed by: SURGERY

## 2021-06-17 PROCEDURE — C1781 MESH (IMPLANTABLE): HCPCS | Performed by: SURGERY

## 2021-06-17 PROCEDURE — 10002803 HB RX 637

## 2021-06-17 PROCEDURE — 10002801 HB RX 250 W/O HCPCS: Performed by: SURGERY

## 2021-06-17 PROCEDURE — 10002807 HB RX 258

## 2021-06-17 PROCEDURE — 13000098 HB GENERAL ROBOTIC CASE S/U + 1ST 15 MIN: Performed by: SURGERY

## 2021-06-17 PROCEDURE — 10002800 HB RX 250 W HCPCS

## 2021-06-17 PROCEDURE — 13000003 HB ANESTHESIA  GENERAL EA ADD MINUTE: Performed by: SURGERY

## 2021-06-17 PROCEDURE — 10002801 HB RX 250 W/O HCPCS

## 2021-06-17 PROCEDURE — 13000001 HB PHASE II RECOVERY EA 30 MINUTES: Performed by: SURGERY

## 2021-06-17 PROCEDURE — 10006027 HB SUPPLY 278: Performed by: SURGERY

## 2021-06-17 PROCEDURE — 10002800 HB RX 250 W HCPCS: Performed by: SURGERY

## 2021-06-17 PROCEDURE — 10006023 HB SUPPLY 272: Performed by: SURGERY

## 2021-06-17 DEVICE — LAPAROSCOPIC SELF-FIXATING MESH POLYESTER WITH POLYLACTIC ACID GRIPS AND COLLAGEN FILM
Type: IMPLANTABLE DEVICE | Site: ABDOMEN | Status: FUNCTIONAL
Brand: PROGRIP

## 2021-06-17 RX ORDER — HYDRALAZINE HYDROCHLORIDE 20 MG/ML
5 INJECTION INTRAMUSCULAR; INTRAVENOUS EVERY 10 MIN PRN
Status: DISCONTINUED | OUTPATIENT
Start: 2021-06-17 | End: 2021-06-17 | Stop reason: HOSPADM

## 2021-06-17 RX ORDER — MIDAZOLAM HYDROCHLORIDE 1 MG/ML
INJECTION, SOLUTION INTRAMUSCULAR; INTRAVENOUS PRN
Status: DISCONTINUED | OUTPATIENT
Start: 2021-06-17 | End: 2021-06-17

## 2021-06-17 RX ORDER — DEXTROSE MONOHYDRATE 50 MG/ML
INJECTION, SOLUTION INTRAVENOUS CONTINUOUS PRN
Status: DISCONTINUED | OUTPATIENT
Start: 2021-06-17 | End: 2021-06-17 | Stop reason: HOSPADM

## 2021-06-17 RX ORDER — SODIUM CHLORIDE, SODIUM LACTATE, POTASSIUM CHLORIDE, CALCIUM CHLORIDE 600; 310; 30; 20 MG/100ML; MG/100ML; MG/100ML; MG/100ML
INJECTION, SOLUTION INTRAVENOUS CONTINUOUS
Status: DISCONTINUED | OUTPATIENT
Start: 2021-06-17 | End: 2021-06-17 | Stop reason: HOSPADM

## 2021-06-17 RX ORDER — ONDANSETRON 2 MG/ML
4 INJECTION INTRAMUSCULAR; INTRAVENOUS ONCE
Status: DISCONTINUED | OUTPATIENT
Start: 2021-06-17 | End: 2021-06-17 | Stop reason: HOSPADM

## 2021-06-17 RX ORDER — ONDANSETRON 2 MG/ML
INJECTION INTRAMUSCULAR; INTRAVENOUS PRN
Status: DISCONTINUED | OUTPATIENT
Start: 2021-06-17 | End: 2021-06-17

## 2021-06-17 RX ORDER — BUPIVACAINE HYDROCHLORIDE 5 MG/ML
INJECTION, SOLUTION EPIDURAL; INTRACAUDAL PRN
Status: DISCONTINUED | OUTPATIENT
Start: 2021-06-17 | End: 2021-06-17 | Stop reason: HOSPADM

## 2021-06-17 RX ORDER — SODIUM CHLORIDE 9 MG/ML
INJECTION, SOLUTION INTRAVENOUS CONTINUOUS
Status: DISCONTINUED | OUTPATIENT
Start: 2021-06-17 | End: 2021-06-17 | Stop reason: HOSPADM

## 2021-06-17 RX ORDER — METOCLOPRAMIDE HYDROCHLORIDE 5 MG/ML
10 INJECTION INTRAMUSCULAR; INTRAVENOUS ONCE
Status: DISCONTINUED | OUTPATIENT
Start: 2021-06-17 | End: 2021-06-17 | Stop reason: HOSPADM

## 2021-06-17 RX ORDER — LIDOCAINE HYDROCHLORIDE 20 MG/ML
INJECTION, SOLUTION INFILTRATION; PERINEURAL PRN
Status: DISCONTINUED | OUTPATIENT
Start: 2021-06-17 | End: 2021-06-17

## 2021-06-17 RX ORDER — ALBUTEROL SULFATE 2.5 MG/3ML
5 SOLUTION RESPIRATORY (INHALATION) ONCE
Status: DISCONTINUED | OUTPATIENT
Start: 2021-06-17 | End: 2021-06-17 | Stop reason: HOSPADM

## 2021-06-17 RX ORDER — FAMOTIDINE 20 MG/1
20 TABLET, FILM COATED ORAL
Status: COMPLETED | OUTPATIENT
Start: 2021-06-17 | End: 2021-06-17

## 2021-06-17 RX ORDER — GLYCOPYRROLATE 0.2 MG/ML
INJECTION, SOLUTION INTRAMUSCULAR; INTRAVENOUS PRN
Status: DISCONTINUED | OUTPATIENT
Start: 2021-06-17 | End: 2021-06-17

## 2021-06-17 RX ORDER — HUMAN INSULIN 100 [IU]/ML
INJECTION, SOLUTION SUBCUTANEOUS
Status: DISCONTINUED | OUTPATIENT
Start: 2021-06-17 | End: 2021-06-17 | Stop reason: HOSPADM

## 2021-06-17 RX ORDER — 0.9 % SODIUM CHLORIDE 0.9 %
2 VIAL (ML) INJECTION EVERY 12 HOURS SCHEDULED
Status: DISCONTINUED | OUTPATIENT
Start: 2021-06-17 | End: 2021-06-17 | Stop reason: HOSPADM

## 2021-06-17 RX ORDER — TRAMADOL HYDROCHLORIDE 50 MG/1
50 TABLET ORAL EVERY 6 HOURS PRN
Qty: 20 TABLET | Refills: 0 | Status: SHIPPED | OUTPATIENT
Start: 2021-06-17

## 2021-06-17 RX ORDER — HYDROCODONE BITARTRATE AND ACETAMINOPHEN 10; 325 MG/1; MG/1
1 TABLET ORAL EVERY 6 HOURS PRN
Status: DISCONTINUED | OUTPATIENT
Start: 2021-06-17 | End: 2021-06-17 | Stop reason: HOSPADM

## 2021-06-17 RX ORDER — PROPOFOL 10 MG/ML
INJECTION, EMULSION INTRAVENOUS PRN
Status: DISCONTINUED | OUTPATIENT
Start: 2021-06-17 | End: 2021-06-17

## 2021-06-17 RX ORDER — DEXAMETHASONE SODIUM PHOSPHATE 4 MG/ML
INJECTION, SOLUTION INTRA-ARTICULAR; INTRALESIONAL; INTRAMUSCULAR; INTRAVENOUS; SOFT TISSUE PRN
Status: DISCONTINUED | OUTPATIENT
Start: 2021-06-17 | End: 2021-06-17

## 2021-06-17 RX ORDER — NEOSTIGMINE METHYLSULFATE 4 MG/4 ML
SYRINGE (ML) INTRAVENOUS PRN
Status: DISCONTINUED | OUTPATIENT
Start: 2021-06-17 | End: 2021-06-17

## 2021-06-17 RX ORDER — DIPHENHYDRAMINE HYDROCHLORIDE 50 MG/ML
25 INJECTION INTRAMUSCULAR; INTRAVENOUS
Status: DISCONTINUED | OUTPATIENT
Start: 2021-06-17 | End: 2021-06-17 | Stop reason: HOSPADM

## 2021-06-17 RX ORDER — ROCURONIUM BROMIDE 10 MG/ML
INJECTION, SOLUTION INTRAVENOUS PRN
Status: DISCONTINUED | OUTPATIENT
Start: 2021-06-17 | End: 2021-06-17

## 2021-06-17 RX ORDER — NICOTINE POLACRILEX 4 MG
30 LOZENGE BUCCAL
Status: DISCONTINUED | OUTPATIENT
Start: 2021-06-17 | End: 2021-06-17 | Stop reason: HOSPADM

## 2021-06-17 RX ORDER — ONDANSETRON 4 MG/1
4 TABLET, ORALLY DISINTEGRATING ORAL ONCE
Status: DISCONTINUED | OUTPATIENT
Start: 2021-06-17 | End: 2021-06-17 | Stop reason: HOSPADM

## 2021-06-17 RX ORDER — DEXTROSE MONOHYDRATE 25 G/50ML
25 INJECTION, SOLUTION INTRAVENOUS PRN
Status: DISCONTINUED | OUTPATIENT
Start: 2021-06-17 | End: 2021-06-17 | Stop reason: HOSPADM

## 2021-06-17 RX ADMIN — FENTANYL CITRATE 50 MCG: 50 INJECTION, SOLUTION INTRAMUSCULAR; INTRAVENOUS at 14:28

## 2021-06-17 RX ADMIN — ROCURONIUM BROMIDE 10 MG: 50 INJECTION, SOLUTION INTRAVENOUS at 12:57

## 2021-06-17 RX ADMIN — PROPOFOL 200 MG: 10 INJECTION, EMULSION INTRAVENOUS at 12:05

## 2021-06-17 RX ADMIN — Medication 4 MG: at 15:08

## 2021-06-17 RX ADMIN — FENTANYL CITRATE 100 MCG: 50 INJECTION, SOLUTION INTRAMUSCULAR; INTRAVENOUS at 12:05

## 2021-06-17 RX ADMIN — LIDOCAINE HYDROCHLORIDE 2 ML: 20 INJECTION, SOLUTION INFILTRATION; PERINEURAL at 12:05

## 2021-06-17 RX ADMIN — DEXAMETHASONE SODIUM PHOSPHATE 6 MG: 4 INJECTION, SOLUTION INTRAMUSCULAR; INTRAVENOUS at 12:22

## 2021-06-17 RX ADMIN — GLYCOPYRROLATE 0.2 MG: 0.2 INJECTION, SOLUTION INTRAMUSCULAR; INTRAVENOUS at 12:05

## 2021-06-17 RX ADMIN — ONDANSETRON 4 MG: 2 INJECTION INTRAMUSCULAR; INTRAVENOUS at 12:22

## 2021-06-17 RX ADMIN — CEFAZOLIN SODIUM 2000 MG: 300 INJECTION, POWDER, LYOPHILIZED, FOR SOLUTION INTRAVENOUS at 12:39

## 2021-06-17 RX ADMIN — FENTANYL CITRATE 50 MCG: 50 INJECTION, SOLUTION INTRAMUSCULAR; INTRAVENOUS at 12:50

## 2021-06-17 RX ADMIN — SODIUM CHLORIDE, POTASSIUM CHLORIDE, SODIUM LACTATE AND CALCIUM CHLORIDE: 600; 310; 30; 20 INJECTION, SOLUTION INTRAVENOUS at 14:40

## 2021-06-17 RX ADMIN — GLYCOPYRROLATE 0.8 MG: 0.2 INJECTION, SOLUTION INTRAMUSCULAR; INTRAVENOUS at 15:08

## 2021-06-17 RX ADMIN — SODIUM CHLORIDE, POTASSIUM CHLORIDE, SODIUM LACTATE AND CALCIUM CHLORIDE: 600; 310; 30; 20 INJECTION, SOLUTION INTRAVENOUS at 11:24

## 2021-06-17 RX ADMIN — FAMOTIDINE 20 MG: 20 TABLET ORAL at 11:13

## 2021-06-17 RX ADMIN — ROCURONIUM BROMIDE 50 MG: 50 INJECTION, SOLUTION INTRAVENOUS at 12:05

## 2021-06-17 RX ADMIN — FENTANYL CITRATE 50 MCG: 50 INJECTION INTRAMUSCULAR; INTRAVENOUS at 15:45

## 2021-06-17 RX ADMIN — MIDAZOLAM HYDROCHLORIDE 2 MG: 1 INJECTION, SOLUTION INTRAMUSCULAR; INTRAVENOUS at 12:05

## 2021-06-17 ASSESSMENT — PAIN SCALES - GENERAL
PAINLEVEL_OUTOF10: 6
PAINLEVEL_OUTOF10: 7
PAINLEVEL_OUTOF10: 0
PAINLEVEL_OUTOF10: 5
PAINLEVEL_OUTOF10: 0
PAINLEVEL_OUTOF10: 6

## 2022-08-22 ENCOUNTER — OFFICE VISIT (OUTPATIENT)
Dept: OTHER | Facility: HOSPITAL | Age: 62
End: 2022-08-22
Attending: EMERGENCY MEDICINE

## 2022-08-22 ENCOUNTER — HOSPITAL ENCOUNTER (OUTPATIENT)
Dept: GENERAL RADIOLOGY | Facility: HOSPITAL | Age: 62
Discharge: HOME | End: 2022-08-22
Attending: EMERGENCY MEDICINE

## 2022-08-22 DIAGNOSIS — R52 PAIN: Primary | ICD-10-CM

## 2022-08-22 DIAGNOSIS — R52 PAIN: ICD-10-CM

## 2022-08-22 PROCEDURE — 73080 X-RAY EXAM OF ELBOW: CPT | Performed by: EMERGENCY MEDICINE

## 2022-08-29 ENCOUNTER — APPOINTMENT (OUTPATIENT)
Dept: OTHER | Facility: HOSPITAL | Age: 62
End: 2022-08-29
Attending: EMERGENCY MEDICINE

## 2022-09-12 ENCOUNTER — APPOINTMENT (OUTPATIENT)
Dept: OTHER | Facility: HOSPITAL | Age: 62
End: 2022-09-12
Attending: EMERGENCY MEDICINE

## 2022-11-21 NOTE — CM/SW NOTE
Pt has been screened per chart review and during nursing rounds. Pt is from home w/ his wife and is independent. Pt has no identified needs at this time. Anticipated discharge Tuesday; SW/CM will remain available for support and any discharge needs.      Ki [Follow-Up: _____] : a [unfilled] follow-up visit

## 2023-11-05 NOTE — PATIENT INSTRUCTIONS
- You were seen in clinic for regular annual check-up. We have ordered labs for you and we will call you with the results. Please obtain the bloodwork fasting for 12 hours. OK to drink water the day of your blood draw. We have the lab downstairs on the first floor. No appointment is necessary. Lab hours are Monday-Friday 7:30 AM to 4:45 PM.  There may be Saturday hours 7 AM-11:00 AM as well. Otherwise you can obtain the blood tests on the weekends at the main hospital or local immediate care/urgent care within the [facility] system. For the feet, we will rule out any metabolic causes such as high sugar levels, vitamin deficiency that may be contributing to the numbness and tingling sensations  It seems that we may have symptoms of hammertoe of the left second toe and you were diagnosed with a strain/sprain to the forefoot from your recent injury    We recommended:    -Acetaminophen 500-650 mg every 4-6 hours as needed for pain relief  - Diclofenac 75 mg twice a day, filled to your pharmacy  -For more severe pain, notify us as we can consider alternative medications  - Try to use the splint/brace for your regular shoes. This may be difficult to use during work times. You can try buddy taping the first 2 toes together  - Consider the use of general insoles for further support      - We should target weight loss over time which will help bring down sugar and cholesterol elevels -you have had great success with this in the past, lets continue with optimizing nutrition and optimizing exercise as able. - Your next colonoscopy will be due 2024 with Dr. Ced Almaguer  - Vaccines you may be due for: flu shot, COVID Dose #7, We recommended checking with your insurance for coverage of Shingrix, a 2-dose shingles vaccine that can be obtained at the pharmacy if there is adequate coverage through your insurance plan.   - Please continue to eat a varied diet including recommended servings of vegetables, fruits, and low fat dairy. Minimize high saturated fats (such as fast foods) and high sugar intake (such as soda)  - We recommend 150 minutes of moderate intensity exercise (brisk walking, swimming) weekly to maintain your current weight. Targeted weight loss will require more vigorous exercise or more than 150 minutes/week.     Return to clinic in 6 months for follow-up

## 2023-11-07 ENCOUNTER — OFFICE VISIT (OUTPATIENT)
Dept: INTERNAL MEDICINE CLINIC | Facility: CLINIC | Age: 63
End: 2023-11-07

## 2023-11-07 VITALS
BODY MASS INDEX: 38.5 KG/M2 | OXYGEN SATURATION: 94 % | TEMPERATURE: 99 F | DIASTOLIC BLOOD PRESSURE: 80 MMHG | HEIGHT: 68.4 IN | WEIGHT: 257 LBS | SYSTOLIC BLOOD PRESSURE: 120 MMHG | HEART RATE: 82 BPM

## 2023-11-07 DIAGNOSIS — Z12.5 SCREENING FOR PROSTATE CANCER: ICD-10-CM

## 2023-11-07 DIAGNOSIS — E78.2 MIXED HYPERLIPIDEMIA: ICD-10-CM

## 2023-11-07 DIAGNOSIS — K76.0 NAFLD (NONALCOHOLIC FATTY LIVER DISEASE): ICD-10-CM

## 2023-11-07 DIAGNOSIS — R73.03 PREDIABETES: ICD-10-CM

## 2023-11-07 DIAGNOSIS — Z00.00 ANNUAL PHYSICAL EXAM: Primary | ICD-10-CM

## 2023-11-07 DIAGNOSIS — Z13.0 SCREENING FOR DEFICIENCY ANEMIA: ICD-10-CM

## 2023-11-07 DIAGNOSIS — Z83.3 FAMILY HISTORY OF DIABETES MELLITUS: ICD-10-CM

## 2023-11-07 DIAGNOSIS — E53.8 VITAMIN B12 DEFICIENCY: ICD-10-CM

## 2023-11-07 DIAGNOSIS — E55.9 VITAMIN D DEFICIENCY: ICD-10-CM

## 2023-11-07 DIAGNOSIS — Z13.1 SCREENING FOR DIABETES MELLITUS: ICD-10-CM

## 2023-11-07 DIAGNOSIS — Z13.29 SCREENING FOR THYROID DISORDER: ICD-10-CM

## 2023-11-07 PROCEDURE — 3008F BODY MASS INDEX DOCD: CPT | Performed by: INTERNAL MEDICINE

## 2023-11-07 PROCEDURE — 3074F SYST BP LT 130 MM HG: CPT | Performed by: INTERNAL MEDICINE

## 2023-11-07 PROCEDURE — 3079F DIAST BP 80-89 MM HG: CPT | Performed by: INTERNAL MEDICINE

## 2023-11-07 PROCEDURE — 99386 PREV VISIT NEW AGE 40-64: CPT | Performed by: INTERNAL MEDICINE

## 2023-11-07 RX ORDER — DICLOFENAC SODIUM 75 MG/1
75 TABLET, DELAYED RELEASE ORAL 2 TIMES DAILY
Qty: 60 TABLET | Refills: 1 | Status: SHIPPED | OUTPATIENT
Start: 2023-11-07

## 2023-11-09 ENCOUNTER — LAB ENCOUNTER (OUTPATIENT)
Dept: LAB | Facility: HOSPITAL | Age: 63
End: 2023-11-09
Attending: INTERNAL MEDICINE
Payer: COMMERCIAL

## 2023-11-09 DIAGNOSIS — K76.0 NAFLD (NONALCOHOLIC FATTY LIVER DISEASE): ICD-10-CM

## 2023-11-09 DIAGNOSIS — E78.2 MIXED HYPERLIPIDEMIA: ICD-10-CM

## 2023-11-09 DIAGNOSIS — Z13.0 SCREENING FOR DEFICIENCY ANEMIA: ICD-10-CM

## 2023-11-09 DIAGNOSIS — Z13.29 SCREENING FOR THYROID DISORDER: ICD-10-CM

## 2023-11-09 DIAGNOSIS — E55.9 VITAMIN D DEFICIENCY: ICD-10-CM

## 2023-11-09 DIAGNOSIS — Z00.00 ANNUAL PHYSICAL EXAM: ICD-10-CM

## 2023-11-09 DIAGNOSIS — Z83.3 FAMILY HISTORY OF DIABETES MELLITUS: ICD-10-CM

## 2023-11-09 DIAGNOSIS — E53.8 VITAMIN B12 DEFICIENCY: ICD-10-CM

## 2023-11-09 DIAGNOSIS — R73.03 PREDIABETES: ICD-10-CM

## 2023-11-09 DIAGNOSIS — Z12.5 SCREENING FOR PROSTATE CANCER: ICD-10-CM

## 2023-11-09 LAB
ALBUMIN SERPL-MCNC: 4.4 G/DL (ref 3.2–4.8)
ALBUMIN/GLOB SERPL: 1.3 {RATIO} (ref 1–2)
ALP LIVER SERPL-CCNC: 75 U/L
ALT SERPL-CCNC: 33 U/L
ANION GAP SERPL CALC-SCNC: 4 MMOL/L (ref 0–18)
AST SERPL-CCNC: 21 U/L (ref ?–34)
BASOPHILS # BLD AUTO: 0.02 X10(3) UL (ref 0–0.2)
BASOPHILS NFR BLD AUTO: 0.3 %
BILIRUB SERPL-MCNC: 0.5 MG/DL (ref 0.2–1.1)
BUN BLD-MCNC: 15 MG/DL (ref 9–23)
BUN/CREAT SERPL: 20.5 (ref 10–20)
CALCIUM BLD-MCNC: 9.3 MG/DL (ref 8.7–10.4)
CHLORIDE SERPL-SCNC: 109 MMOL/L (ref 98–112)
CHOLEST SERPL-MCNC: 213 MG/DL (ref ?–200)
CO2 SERPL-SCNC: 24 MMOL/L (ref 21–32)
COMPLEXED PSA SERPL-MCNC: 2.05 NG/ML (ref ?–4)
COMPLEXED PSA SERPL-MCNC: 3.37 NG/ML (ref ?–4)
CREAT BLD-MCNC: 0.73 MG/DL
DEPRECATED RDW RBC AUTO: 43.2 FL (ref 35.1–46.3)
EGFRCR SERPLBLD CKD-EPI 2021: 102 ML/MIN/1.73M2 (ref 60–?)
EOSINOPHIL # BLD AUTO: 0.09 X10(3) UL (ref 0–0.7)
EOSINOPHIL NFR BLD AUTO: 1.6 %
ERYTHROCYTE [DISTWIDTH] IN BLOOD BY AUTOMATED COUNT: 12.8 % (ref 11–15)
EST. AVERAGE GLUCOSE BLD GHB EST-MCNC: 120 MG/DL (ref 68–126)
FASTING PATIENT LIPID ANSWER: YES
FASTING STATUS PATIENT QL REPORTED: YES
GLOBULIN PLAS-MCNC: 3.3 G/DL (ref 2.8–4.4)
GLUCOSE BLD-MCNC: 93 MG/DL (ref 70–99)
HBA1C MFR BLD: 5.8 % (ref ?–5.7)
HCT VFR BLD AUTO: 42.1 %
HDLC SERPL-MCNC: 47 MG/DL (ref 40–59)
HGB BLD-MCNC: 14.6 G/DL
IMM GRANULOCYTES # BLD AUTO: 0.02 X10(3) UL (ref 0–1)
IMM GRANULOCYTES NFR BLD: 0.3 %
LDLC SERPL CALC-MCNC: 149 MG/DL (ref ?–100)
LYMPHOCYTES # BLD AUTO: 1.71 X10(3) UL (ref 1–4)
LYMPHOCYTES NFR BLD AUTO: 29.5 %
MCH RBC QN AUTO: 31.9 PG (ref 26–34)
MCHC RBC AUTO-ENTMCNC: 34.7 G/DL (ref 31–37)
MCV RBC AUTO: 91.9 FL
MONOCYTES # BLD AUTO: 0.52 X10(3) UL (ref 0.1–1)
MONOCYTES NFR BLD AUTO: 9 %
NEUTROPHILS # BLD AUTO: 3.44 X10 (3) UL (ref 1.5–7.7)
NEUTROPHILS # BLD AUTO: 3.44 X10(3) UL (ref 1.5–7.7)
NEUTROPHILS NFR BLD AUTO: 59.3 %
NONHDLC SERPL-MCNC: 166 MG/DL (ref ?–130)
OSMOLALITY SERPL CALC.SUM OF ELEC: 285 MOSM/KG (ref 275–295)
PLATELET # BLD AUTO: 263 10(3)UL (ref 150–450)
POTASSIUM SERPL-SCNC: 4.2 MMOL/L (ref 3.5–5.1)
PROT SERPL-MCNC: 7.7 G/DL (ref 5.7–8.2)
RBC # BLD AUTO: 4.58 X10(6)UL
SODIUM SERPL-SCNC: 137 MMOL/L (ref 136–145)
TRIGL SERPL-MCNC: 96 MG/DL (ref 30–149)
TSI SER-ACNC: 2.51 MIU/ML (ref 0.55–4.78)
VIT B12 SERPL-MCNC: 1156 PG/ML (ref 211–911)
VIT D+METAB SERPL-MCNC: 49.3 NG/ML (ref 30–100)
VLDLC SERPL CALC-MCNC: 18 MG/DL (ref 0–30)
WBC # BLD AUTO: 5.8 X10(3) UL (ref 4–11)

## 2023-11-09 PROCEDURE — 83036 HEMOGLOBIN GLYCOSYLATED A1C: CPT

## 2023-11-09 PROCEDURE — 80053 COMPREHEN METABOLIC PANEL: CPT

## 2023-11-09 PROCEDURE — 36415 COLL VENOUS BLD VENIPUNCTURE: CPT

## 2023-11-09 PROCEDURE — 80061 LIPID PANEL: CPT

## 2023-11-09 PROCEDURE — 82306 VITAMIN D 25 HYDROXY: CPT

## 2023-11-09 PROCEDURE — 82607 VITAMIN B-12: CPT

## 2023-11-09 PROCEDURE — 84443 ASSAY THYROID STIM HORMONE: CPT

## 2023-11-09 PROCEDURE — 85025 COMPLETE CBC W/AUTO DIFF WBC: CPT

## 2023-11-13 ENCOUNTER — TELEPHONE (OUTPATIENT)
Dept: INTERNAL MEDICINE CLINIC | Facility: CLINIC | Age: 63
End: 2023-11-13

## 2023-11-13 DIAGNOSIS — E78.2 MIXED HYPERLIPIDEMIA: ICD-10-CM

## 2023-11-13 DIAGNOSIS — T46.6X5A ADVERSE EFFECT OF STATIN: Primary | ICD-10-CM

## 2023-11-13 LAB — AMB EXT COVID-19 RESULT: DETECTED

## 2023-11-13 RX ORDER — ATORVASTATIN CALCIUM 20 MG/1
20 TABLET, FILM COATED ORAL NIGHTLY
Qty: 90 TABLET | Refills: 1 | Status: SHIPPED | OUTPATIENT
Start: 2023-11-13

## 2023-11-13 NOTE — TELEPHONE ENCOUNTER
I sent a [employer] message    The 10-year ASCVD risk score (Virginie JO, et al., 2019) is: 11.5%    Values used to calculate the score:      Age: 58 years      Sex: Male      Is Non- : No      Diabetic: No      Tobacco smoker: No      Systolic Blood Pressure: 957 mmHg      Is BP treated: No      HDL Cholesterol: 47 mg/dL      Total Cholesterol: 213 mg/dL    Should start lipitor 20 mg

## 2023-11-14 ENCOUNTER — TELEPHONE (OUTPATIENT)
Dept: INTERNAL MEDICINE CLINIC | Facility: CLINIC | Age: 63
End: 2023-11-14

## 2023-11-14 NOTE — TELEPHONE ENCOUNTER
Thank you for pending the letter, I have signed it. Sent through [address] Please notify the patient I also sent for Paxlovid that he can take as directed. May help reduce his symptoms by 1-2 days.   Sent to his [place].

## 2023-11-14 NOTE — TELEPHONE ENCOUNTER
Patient tested positive for Covid yesterday. He would like Rx for Covid symptoms. Patient also needs paperwork from his Dr for his job to show he has Covid.  Please call and advise    #[phone]

## 2023-11-14 NOTE — TELEPHONE ENCOUNTER
COVID triage:    Start of symptoms: Saturday night, tested positive yesterday 11/13    Fever:  [x]  No fever  []  Temperature:   []  Chills  []  Night sweats    Cough:  [x] Productive cough  [] Cough with exertion  [] Dry cough    Breathing:  [] Wheezing  [] Pain with deep breathing  [] SOB with exertion  [] SOB at rest  [] Heavy breathing  [] Chest discomfort with deep breathing or coughing    GI Symptoms:  [] Diarrhea  [] Nausea  [] Vomiting  [] Abdominal pain  [] Lack of appetite    Other symptoms:  [x] Sore throat  [] Difficulty swallowing  [] Nasal drainage  [] Nasal congestion  [x] PND  [] Sinus pressure  [] Chest congestion  [] Head congestion  [] Facial pain   [] Ear pain  [x] Body aches  [] Loss of sense of smell   [] Loss of sense of taste  []Conjunctivitis  [x] Headache  [x] Fatigue  [x] Weakness    [x]OTC Medications:  Tylenol   NyQuil/DayQuil      Vaccinated: Yes  [x]   No []  Booster:  Yes  [x]  No []      To Dr. Manisha Sim:  Pt needs note for work confirming covid infection. Pt requesting RTW date for 11/20. Ok to send letter via [address] Pended in communications tab for review. Patient was notified that this message will be routed to the physician to determine what their recommendation (s) would be. In the meantime, if they develop new or worsening symptoms, they were advised to call back or seek emergent evaluation at the ER. Discussed the following quarantine guidelines and instructions: According to CDC guidelines,  If symptomatic: you should stay home and quarantine for 5 full days. Day 1 is the first full day after your symptoms developed (24 hrs later). Wear a well-fitted mask if you must be around others in your home. End isolation after completing 5 full days, fever-free for 24 hours (without the use of fever-reducing medication), and your symptoms are improving. If you were severely ill with COVID-19 you should isolate for at least 10 days.  Consult your doctor before ending isolation. Wear a well-fitted mask for 10 full days any time you are around others inside your home or in public. Do not go to places where you are unable to wear a mask. Avoid being around people who are at high risk. Do not travel until a full 10 days after your symptoms started. Monitor your symptoms at home and call the office with any new or worsening symptoms. ER is recommended should you develop shortness of breath, chest pain, high fever that's non-responsive to fever reducing medicine, or spo2 (oxygen level) <90% (if pulse ox is accesible).

## 2024-01-15 RX ORDER — DICLOFENAC SODIUM 75 MG/1
75 TABLET, DELAYED RELEASE ORAL 2 TIMES DAILY
Qty: 60 TABLET | Refills: 2 | Status: SHIPPED | OUTPATIENT
Start: 2024-01-15

## 2024-01-15 NOTE — TELEPHONE ENCOUNTER
Refill request is for a maintenance medication and has met the criteria specified in the Ambulatory Medication Refill Standing Order for eligibility, visits, laboratory, alerts and was sent to the requested pharmacy.    Requested Prescriptions     Signed Prescriptions Disp Refills    diclofenac 75 MG Oral Tab EC 60 tablet 2     Sig: Take 1 tablet by mouth 2 times daily.     Authorizing Provider: MIREILLE POOLE     Ordering User: CASS MOE

## 2024-02-12 ENCOUNTER — TELEPHONE (OUTPATIENT)
Dept: INTERNAL MEDICINE CLINIC | Facility: CLINIC | Age: 64
End: 2024-02-12

## 2024-02-12 ENCOUNTER — LAB ENCOUNTER (OUTPATIENT)
Dept: LAB | Facility: HOSPITAL | Age: 64
End: 2024-02-12
Attending: INTERNAL MEDICINE
Payer: COMMERCIAL

## 2024-02-12 DIAGNOSIS — T46.6X5A ADVERSE EFFECT OF STATIN: ICD-10-CM

## 2024-02-12 DIAGNOSIS — E78.2 MIXED HYPERLIPIDEMIA: ICD-10-CM

## 2024-02-12 LAB
ALBUMIN SERPL-MCNC: 4.4 G/DL (ref 3.2–4.8)
ALBUMIN/GLOB SERPL: 1.2 {RATIO} (ref 1–2)
ALP LIVER SERPL-CCNC: 82 U/L
ALT SERPL-CCNC: 30 U/L
ANION GAP SERPL CALC-SCNC: 4 MMOL/L (ref 0–18)
AST SERPL-CCNC: 18 U/L (ref ?–34)
BILIRUB SERPL-MCNC: 0.5 MG/DL (ref 0.2–1.1)
BUN BLD-MCNC: 12 MG/DL (ref 9–23)
BUN/CREAT SERPL: 16.9 (ref 10–20)
CALCIUM BLD-MCNC: 9.4 MG/DL (ref 8.7–10.4)
CHLORIDE SERPL-SCNC: 106 MMOL/L (ref 98–112)
CHOLEST SERPL-MCNC: 145 MG/DL (ref ?–200)
CO2 SERPL-SCNC: 29 MMOL/L (ref 21–32)
CREAT BLD-MCNC: 0.71 MG/DL
EGFRCR SERPLBLD CKD-EPI 2021: 102 ML/MIN/1.73M2 (ref 60–?)
FASTING PATIENT LIPID ANSWER: YES
FASTING STATUS PATIENT QL REPORTED: YES
GLOBULIN PLAS-MCNC: 3.6 G/DL (ref 2.8–4.4)
GLUCOSE BLD-MCNC: 84 MG/DL (ref 70–99)
HDLC SERPL-MCNC: 41 MG/DL (ref 40–59)
LDLC SERPL CALC-MCNC: 81 MG/DL (ref ?–100)
NONHDLC SERPL-MCNC: 104 MG/DL (ref ?–130)
OSMOLALITY SERPL CALC.SUM OF ELEC: 287 MOSM/KG (ref 275–295)
POTASSIUM SERPL-SCNC: 4 MMOL/L (ref 3.5–5.1)
PROT SERPL-MCNC: 8 G/DL (ref 5.7–8.2)
SODIUM SERPL-SCNC: 139 MMOL/L (ref 136–145)
TRIGL SERPL-MCNC: 128 MG/DL (ref 30–149)
VLDLC SERPL CALC-MCNC: 20 MG/DL (ref 0–30)

## 2024-02-12 PROCEDURE — 80061 LIPID PANEL: CPT

## 2024-02-12 PROCEDURE — 36415 COLL VENOUS BLD VENIPUNCTURE: CPT

## 2024-02-12 PROCEDURE — 80053 COMPREHEN METABOLIC PANEL: CPT

## 2024-02-12 RX ORDER — ATORVASTATIN CALCIUM 20 MG/1
20 TABLET, FILM COATED ORAL NIGHTLY
Qty: 90 TABLET | Refills: 3 | Status: SHIPPED | OUTPATIENT
Start: 2024-02-12

## 2024-02-12 NOTE — TELEPHONE ENCOUNTER
Called patient and relayed  message - verbalized understandingRefill request is for a maintenance medication and has met the criteria specified in the Ambulatory Medication Refill Standing Order for eligibility, visits, laboratory, alerts and was sent to the requested pharmacy.    Requested Prescriptions     Signed Prescriptions Disp Refills    atorvastatin 20 MG Oral Tab 90 tablet 3     Sig: Take 1 tablet (20 mg total) by mouth nightly.     Authorizing Provider: MIREILLE POOLE     Ordering User: EPIFANIO DARNELL

## 2024-02-12 NOTE — TELEPHONE ENCOUNTER
Please notify patient that all of the lab results look good.  He has had a drastic decrease in his cholesterol numbers all now in the normal range.  Medication is working well for the cholesterol.  Lets keep this up with good nutrition, exercise, targeting weight loss over time.

## 2024-03-25 ENCOUNTER — APPOINTMENT (OUTPATIENT)
Dept: GENERAL RADIOLOGY | Age: 64
End: 2024-03-25
Attending: PHYSICIAN ASSISTANT
Payer: COMMERCIAL

## 2024-03-25 ENCOUNTER — HOSPITAL ENCOUNTER (OUTPATIENT)
Age: 64
Discharge: HOME | End: 2024-03-25
Payer: COMMERCIAL

## 2024-03-25 VITALS
RESPIRATION RATE: 20 BRPM | HEART RATE: 82 BPM | DIASTOLIC BLOOD PRESSURE: 83 MMHG | TEMPERATURE: 98 F | SYSTOLIC BLOOD PRESSURE: 138 MMHG | OXYGEN SATURATION: 96 %

## 2024-03-25 DIAGNOSIS — J40 BRONCHITIS: ICD-10-CM

## 2024-03-25 DIAGNOSIS — J06.9 UPPER RESPIRATORY TRACT INFECTION, UNSPECIFIED TYPE: Primary | ICD-10-CM

## 2024-03-25 PROCEDURE — 99203 OFFICE O/P NEW LOW 30 MIN: CPT

## 2024-03-25 PROCEDURE — 71046 X-RAY EXAM CHEST 2 VIEWS: CPT | Performed by: PHYSICIAN ASSISTANT

## 2024-03-25 PROCEDURE — 99213 OFFICE O/P EST LOW 20 MIN: CPT

## 2024-03-25 RX ORDER — FLUTICASONE PROPIONATE 50 MCG
2 SPRAY, SUSPENSION (ML) NASAL DAILY
Qty: 16 G | Refills: 0 | Status: SHIPPED | OUTPATIENT
Start: 2024-03-25 | End: 2024-04-04

## 2024-03-25 RX ORDER — PREDNISONE 20 MG/1
40 TABLET ORAL DAILY
Qty: 10 TABLET | Refills: 0 | Status: SHIPPED | OUTPATIENT
Start: 2024-03-25 | End: 2024-03-30

## 2024-03-25 NOTE — ED PROVIDER NOTES
Chief Complaint   Patient presents with    Cough/URI       HPI:     Steve Huber is a 63 year old male who presents history of prediabetes on metformin presents for evaluation of nasal congestion postnasal drip and chest congestion developing over the last week.  Denies associated fever, previously taking Mucinex with mild improvement initially switching to DayQuil NyQuil with the last few days.  Notes ongoing cough and chest congestion.  Denies lung issues to date including asthma bronchitis or pneumonia.  Denies associated headache dizziness sore throat dysphagia neck pain chest pain shortness of breath abdominal pain vomiting diarrhea dysuria or rash.      PFSH    PFSH asessment screens reviewed and agree.  Nurses notes reviewed I agree with documentation.    Family History   Problem Relation Age of Onset    Diabetes Father     Hypertension Mother      Family history reviewed with patient/caregiver and is not pertinent to presenting problem.  Social History     Socioeconomic History    Marital status:      Spouse name: Not on file    Number of children: Not on file    Years of education: Not on file    Highest education level: Not on file   Occupational History    Not on file   Tobacco Use    Smoking status: Never    Smokeless tobacco: Never   Vaping Use    Vaping Use: Never used   Substance and Sexual Activity    Alcohol use: Yes     Alcohol/week: 6.0 standard drinks of alcohol     Types: 6 Cans of beer per week     Comment: SOCIAL    Drug use: No    Sexual activity: Yes   Other Topics Concern    Caffeine Concern Not Asked    Exercise Not Asked    Seat Belt Not Asked    Special Diet Not Asked    Stress Concern Not Asked    Weight Concern Not Asked   Social History Narrative    Not on file     Social Determinants of Health     Financial Resource Strain: Not on file   Food Insecurity: Not on file   Transportation Needs: Not on file   Physical Activity: Not on file   Stress: Not on file   Social Connections:  Not on file   Housing Stability: Not on file         ROS:   Positive for stated complaint: Congestion cough.  All other systems reviewed and negative except as noted above.  Constitutional and Vital Signs Reviewed.      Physical Exam:     Findings:    /83   Pulse 82   Temp 98.3 °F (36.8 °C) (Temporal)   Resp 20   SpO2 96%   GENERAL: well developed, well nourished, well hydrated, no distress  SKIN: good skin turgor, no obvious rashes  NECK: No JVD.  No nuchal rigidity.  Supple, no adenopathy  CARDIO: RRR without murmur  EXTREMITIES: no cyanosis or edema. LING without difficulty  GI: soft, non-tender, normal bowel sounds  HEAD: normocephalic, atraumatic  EYES: sclera non icteric bilateral, conjunctiva clear  EARS: TMs clear bilaterally. Canals clear.  NOSE: Positive rhinorrhea.  MMM.  No maxillary sinus tenderness.  Nasal turbinates: pink, normal mucosa  THROAT: clear, without exudates, uvula midline, and airway patent  LUNGS: Decreased lung sounds bilateral bases.  No retractions.  ; no rales, rhonchi, or wheezes  NEURO: No focal deficits  PSYCH: Alert and oriented x3.  Answering questions appropriately.  Mood appropriate.    MDM/Assessment/Plan:   Orders for this encounter:    Orders Placed This Encounter    XR CHEST PA + LAT CHEST (ONC=75552)     Order Specific Question:   What is the Relevant Clinical Indication / Reason for Exam?     Answer:   Chest Congestion     Order Specific Question:   Release to patient     Answer:   Immediate    predniSONE 20 MG Oral Tab     Sig: Take 2 tablets (40 mg total) by mouth daily for 5 days.     Dispense:  10 tablet     Refill:  0    fluticasone propionate 50 MCG/ACT Nasal Suspension     Si sprays by Nasal route daily for 10 days.     Dispense:  16 g     Refill:  0       Labs performed this visit:  No results found for this or any previous visit (from the past 10 hour(s)).    MDM:  Patient agrees based on chest x-ray showing no infiltrate for supportive measures for  likely viral URI versus bronchitis.  Patient moving oxygen well by exam in no distress.  Agrees with no bronchodilator at after discussion requesting corticosteroids with positive response previously, instructed even though prediabetic to take precaution with blood sugar over the next few days which patient agrees.  Readdress outpatient of lingering symptoms with combination Flonase for nasal congestion.  Happy with plan of care.    Diagnosis:    ICD-10-CM    1. Upper respiratory tract infection, unspecified type  J06.9       2. Bronchitis  J40           All results reviewed and discussed with patient.  See AVS for detailed discharge instructions for your condition today.    Follow Up with:  Masoud Parry MD  [address] [place] [id number]  [phone]    Schedule an appointment as soon as possible for a visit in 1 week  As needed, If symptoms worsen

## 2024-03-25 NOTE — ED INITIAL ASSESSMENT (HPI)
Patient with one week of cough and runny nose, states he has phlegm in chest. No fevers, no SOB.

## 2024-04-12 ENCOUNTER — OFFICE VISIT (OUTPATIENT)
Dept: INTERNAL MEDICINE CLINIC | Facility: CLINIC | Age: 64
End: 2024-04-12

## 2024-04-12 VITALS
SYSTOLIC BLOOD PRESSURE: 138 MMHG | TEMPERATURE: 99 F | HEART RATE: 70 BPM | HEIGHT: 68.5 IN | OXYGEN SATURATION: 98 % | DIASTOLIC BLOOD PRESSURE: 78 MMHG | BODY MASS INDEX: 40.45 KG/M2 | WEIGHT: 270 LBS

## 2024-04-12 DIAGNOSIS — J40 BRONCHITIS: Primary | ICD-10-CM

## 2024-04-12 PROCEDURE — 3075F SYST BP GE 130 - 139MM HG: CPT | Performed by: INTERNAL MEDICINE

## 2024-04-12 PROCEDURE — 99213 OFFICE O/P EST LOW 20 MIN: CPT | Performed by: INTERNAL MEDICINE

## 2024-04-12 PROCEDURE — 3008F BODY MASS INDEX DOCD: CPT | Performed by: INTERNAL MEDICINE

## 2024-04-12 PROCEDURE — 3078F DIAST BP <80 MM HG: CPT | Performed by: INTERNAL MEDICINE

## 2024-04-12 RX ORDER — FLUTICASONE PROPIONATE 50 MCG
1 SPRAY, SUSPENSION (ML) NASAL 2 TIMES DAILY
Qty: 9.9 ML | Refills: 1 | Status: SHIPPED | OUTPATIENT
Start: 2024-04-12

## 2024-04-12 RX ORDER — CODEINE PHOSPHATE AND GUAIFENESIN 10; 100 MG/5ML; MG/5ML
5 SOLUTION ORAL EVERY 6 HOURS PRN
Qty: 120 ML | Refills: 0 | Status: SHIPPED | OUTPATIENT
Start: 2024-04-12

## 2024-04-12 RX ORDER — PREDNISONE 20 MG/1
20 TABLET ORAL DAILY
Qty: 5 TABLET | Refills: 0 | Status: SHIPPED | OUTPATIENT
Start: 2024-04-12 | End: 2024-04-17

## 2024-04-12 RX ORDER — FLUTICASONE PROPIONATE 50 MCG
1 SPRAY, SUSPENSION (ML) NASAL 2 TIMES DAILY
COMMUNITY
End: 2024-04-12

## 2024-04-12 NOTE — PROGRESS NOTES
Steve Huber is a 63 year old male.  Chief Complaint   Patient presents with    Checkup    Follow - Up     Lingering cough, productive, PND       HPI:   Steve Huber is a 63 year old male who presents for: cough x 5 weeks    Went to UC-was given prednisone. Felt a little better but now feel like it is getting worse again.    No chest tightness  Cough, clear phlegm  Also PND    Wt Readings from Last 6 Encounters:   04/12/24 270 lb (122.5 kg)   11/07/23 257 lb (116.6 kg)   12/06/21 232 lb (105.2 kg)   10/14/21 234 lb (106.1 kg)   10/06/21 233 lb 12.8 oz (106.1 kg)   06/24/21 226 lb 6.4 oz (102.7 kg)     Body mass index is 40.46 kg/m².       Current Outpatient Medications   Medication Sig Dispense Refill    fluticasone propionate 50 MCG/ACT Nasal Suspension 1 spray by Nasal route in the morning and 1 spray before bedtime. 9.9 mL 1    atorvastatin 20 MG Oral Tab Take 1 tablet (20 mg total) by mouth nightly. 90 tablet 3    diclofenac 75 MG Oral Tab EC Take 1 tablet by mouth 2 times daily. 60 tablet 2    Cyanocobalamin (VITAMIN B-12) 2500 MCG Sublingual SL Tab Place 2,500 mcg under the tongue daily. 90 tablet 3    Multiple Vitamins-Minerals (CENTRUM SILVER 50+MEN OR) Take 1 tablet by mouth daily.      cholecalciferol 125 MCG (5000 UT) Oral Tab Take 1 tablet (5,000 Units total) by mouth daily.        Past Medical History:    Allergic rhinitis    hay fever    Kidney stone    Osteoarthrosis, unspecified whether generalized or localized, unspecified site    Other and unspecified hyperlipidemia      Past Surgical History:   Procedure Laterality Date    Colonoscopy  11/2015    adenomas, diverticulosis. repeat 3 years    Colonoscopy,biopsy  7/17/09    Performed by BRIGIDO MELCHOR at [facility] [place], [facility]    Colonoscopy,remv lesn,snare  7/17/09    Performed by BRIGIDO MELCHOR at [facility], [facility]    Hernia surgery  06/17/2021    Robotic assisted repair of venteral hernia w/mesh    Other surgical history      bilatera  carpal tunnel release    Other surgical history      repair hole in right ear drum    Other surgical history Left     elbow    Total knee replacement Bilateral     Upper gi endoscopy,biopsy  7/17/09    Performed by BRIGIDO MELCHOR at [facility] [place], [facility]      Family History   Problem Relation Age of Onset    Diabetes Father     Hypertension Mother       Social History:   Social History     Socioeconomic History    Marital status:    Tobacco Use    Smoking status: Never    Smokeless tobacco: Never   Vaping Use    Vaping status: Never Used   Substance and Sexual Activity    Alcohol use: Yes     Alcohol/week: 6.0 standard drinks of alcohol     Types: 6 Cans of beer per week     Comment: SOCIAL    Drug use: No    Sexual activity: Yes          REVIEW OF SYSTEMS:   GENERAL: feels well otherwise  HEENT: + nasal congestion. Denies sinus pain, ST, sore throat, ear pain  LUNGS: denies shortness of breath with exertion, wheezing. +cough, sputum production        EXAM:   /78   Pulse 70   Temp 98.5 °F (36.9 °C)   Ht 5' 8.5\" (1.74 m)   Wt 270 lb (122.5 kg)   SpO2 98%   BMI 40.46 kg/m²     GENERAL: well developed, well nourished, in no apparent distress  HEENT: normal oropharynx without erythema or exudate, normal TM's, no sinus tenderness, nares patent  NECK: supple, no carotic bruits, no thyromegaly, no cervical or supraclavicular LAD  LUNGS: clear to auscultation bilaterally, no wheezing or rhonchi  CARDIO: RRR, normal S1S2, no gallops or murmurs      ASSESSMENT AND PLAN:     1. Bronchitis  Rx as below  Cough suppression  Keep hob elevated  Call with worsening sx  - predniSONE 20 MG Oral Tab; Take 1 tablet (20 mg total) by mouth daily for 5 days.  Dispense: 5 tablet; Refill: 0  - guaiFENesin-codeine (CHERATUSSIN AC) 100-10 MG/5ML Oral Solution; Take 5 mL by mouth every 6 (six) hours as needed for cough.  Dispense: 120 mL; Refill: 0    Divina Rasmussen DO  4/12/2024  12:34 PM

## 2024-11-03 NOTE — H&P
Steve Thompson is a 64 year old male.    HPI:     Chief Complaint   Patient presents with    Physical     Annual Px, discuss neuropathy to feet       Mr. THOMPSON is a 64 year old male PMHX HLD, Prediabetes, arthritis who presents for annual physical examiantion    Ongoing for the neuropathy of the feet. Started off with hammertoes. With the morning, feels like pins and needles. Better with activity. He Was in the  and had a lot of physical therapy.    Phentermine seemed to help in the past.    Sleep is usually ok. At least 2x nocturia. There are some times of urgency.    He does get    I reviewed and updated the PMHx, FamHx, medications, allergies, and SocHx as below with the patient    SocHX  - Home: feels safe at home  - Work: feels safe at work; [employer] - lots of maintenance  - Hobbies: fishing  - Nutrition: more bread, should try to cut down on it. Eggs in the morning. Scrambled eggs. Veggies and fruits . Pizza and beer from time to time. Lots of coffee and water.  - Physical Activity: fishing; active at work with steps,      HISTORY:  Past Medical History:    Allergic rhinitis    hay fever    Kidney stone    Osteoarthrosis, unspecified whether generalized or localized, unspecified site    Other and unspecified hyperlipidemia      Past Surgical History:   Procedure Laterality Date    Colonoscopy  11/2015    adenomas, diverticulosis. repeat 3 years    Colonoscopy,biopsy  7/17/09    Performed by BRIGIDO MELCHOR at [facility], [facility]    Colonoscopy,remv silvestre,snare  7/17/09    Performed by BRIGIDO MELCHOR at [facility], [facility]    Hernia surgery  06/17/2021    Robotic assisted repair of venteral hernia w/mesh    Other surgical history      bilatera carpal tunnel release    Other surgical history      repair hole in right ear drum    Other surgical history Left     elbow    Total knee replacement Bilateral     Upper gi endoscopy,biopsy  7/17/09    Performed by RUIZ  BRIGIDO SCHMITT at [facility], [facility]      Family History   Problem Relation Age of Onset    Diabetes Father     Hypertension Mother       Social History:   Social History     Socioeconomic History    Marital status:    Tobacco Use    Smoking status: Never    Smokeless tobacco: Never   Vaping Use    Vaping status: Never Used   Substance and Sexual Activity    Alcohol use: Yes     Alcohol/week: 6.0 standard drinks of alcohol     Types: 6 Cans of beer per week     Comment: SOCIAL    Drug use: No    Sexual activity: Yes        Medications (Active prior to today's visit):  Current Outpatient Medications   Medication Sig Dispense Refill    diclofenac 75 MG Oral Tab EC Take 1 tablet (75 mg total) by mouth 2 (two) times daily as needed. 180 tablet 3    Phentermine HCl 15 MG Oral Cap Take 1 capsule (15 mg total) by mouth every morning. 30 capsule 0    fluticasone propionate 50 MCG/ACT Nasal Suspension 1 spray by Nasal route in the morning and 1 spray before bedtime. (Patient taking differently: 1 spray by Nasal route 2 (two) times daily as needed.) 9.9 mL 1    atorvastatin 20 MG Oral Tab Take 1 tablet (20 mg total) by mouth nightly. 90 tablet 3    Cyanocobalamin (VITAMIN B-12) 2500 MCG Sublingual SL Tab Place 2,500 mcg under the tongue daily. 90 tablet 3    Multiple Vitamins-Minerals (CENTRUM SILVER 50+MEN OR) Take 1 tablet by mouth daily.      cholecalciferol 125 MCG (5000 UT) Oral Tab Take 1 tablet (5,000 Units total) by mouth daily.         Allergies:  Allergies   Allergen Reactions    Penicillins RASH and HIVES    Seasonal ITCHING         ROS:   Positive Findings indicated in BOLD    Constitutional: Fever, Chills, Weight Gain, Weight Loss, Night Sweats, Fatigue, Malaise  ENT/Mouth:  Hearing Changes, Ear Pain, Nasal Congestion, Sinus Pain, Hoarseness, Sore throat, Rhinorrhea, Swallowing Difficulty  Eyes: Eye Pain, Swelling, Redness, Foreign Body, Discharge, Vision Changes  Cardiovascular: Chest Pain, SOB, PND,  Dyspnea on Exertion, Orthopnea, Claudication, Edema, Palpitations  Respiratory: Cough, Sputum, Wheezing, Shortness of breath  Gastrointestinal: Nausea, Vomiting, Diarrhea, Constipation, Pain, Heartburn, Dysphagia, Bloody stools, Tarry stools  Genitourinary: Dysmenorrhea, Dysuria, Urinary Frequency, Hematuria, Urinary Incontinence, Urgency,  Flank Pain  Musculoskeletal: Arthralgias, Myalgias, Joint Swelling, Joint Stiffness, Back Pain, Neck Pain, foot pain  Integumentary: Skin Lesions, Pruritis, Hair Changes, Jaundice, Nail changes  Neuro: Weakness, Numbness, Paresthesias, Loss of Consciousness, Syncope, Dizziness, Headache, Falls  Psych: Anxiety, Depression, Insomnia, Suicidal Ideation, Homicidal ideation, Memory Changes  Heme/Lymph: Bruising, Bleeding, Lymphadenopathy  Endocrine: Polyuria, Polydipsia, Temperature Intolerance    PHYSICAL EXAM:   Vital Signs:  Blood pressure 126/74, pulse 73, temperature 98.1 °F (36.7 °C), height 5' 8.5\" (1.74 m), weight 269 lb 6.4 oz (122.2 kg), SpO2 96%.     Constitutional: No acute distress. Alert and oriented x 3.  Eyes: EOMI, PERRLA, clear sclera b/l  HENT: NCAT, Moist mucous membranes, Oropharynx without erythema or exudates  Neck: No JVD, no thyromegaly  Cardiovascular: S1, S2, no S3, no S4, Regular rate and rhythm, No murmurs/gallops/rubs.   Vascular: Equal pulses 2+ carotids no bruits or thrills/radial/DP/PT bilaterally  Respiratory: Clear to auscultation bilaterally.  No wheezes/rales/rhonchi  Gastrointestinal: Soft, nontender, nondistended. Positive bowel sounds x 4. No rebound tenderness. No hepatomegaly, No splenomegaly  Genitourinary: No CVA tenderness bilaterally  -Prostate without nodules, nontender  Neurologic: No focal neurological deficits, CN II-XII intact, light touch intact, MSK Strength 5/5 and symmetric in all extremities, normal gait, 2+ patellar tendon  Musculoskeletal: Full range of motion of all extremities, no clubbing/swelling/edema  + Hammertoe of left  second toe  Skin: No lesions, No erythema, no jaundice, Cap Refill < 2s  Psychiatric: Appropriate mood and affect  Heme/Lymph/Immune: No cervical LAD      DATA REVIEWED   Labs:  Recent Results (from the past 8760 hours)   CBC W/ DIFFERENTIAL    Collection Time: 11/09/23  8:36 AM   Result Value Ref Range    WBC 5.8 4.0 - 11.0 x10(3) uL    RBC 4.58 4.30 - 5.70 x10(6)uL    HGB 14.6 13.0 - 17.5 g/dL    HCT 42.1 39.0 - 53.0 %    MCV 91.9 80.0 - 100.0 fL    MCH 31.9 26.0 - 34.0 pg    MCHC 34.7 31.0 - 37.0 g/dL    RDW-SD 43.2 35.1 - 46.3 fL    RDW 12.8 11.0 - 15.0 %    .0 150.0 - 450.0 10(3)uL    Neutrophil Absolute Prelim 3.44 1.50 - 7.70 x10 (3) uL    Neutrophil Absolute 3.44 1.50 - 7.70 x10(3) uL    Lymphocyte Absolute 1.71 1.00 - 4.00 x10(3) uL    Monocyte Absolute 0.52 0.10 - 1.00 x10(3) uL    Eosinophil Absolute 0.09 0.00 - 0.70 x10(3) uL    Basophil Absolute 0.02 0.00 - 0.20 x10(3) uL    Immature Granulocyte Absolute 0.02 0.00 - 1.00 x10(3) uL    Neutrophil % 59.3 %    Lymphocyte % 29.5 %    Monocyte % 9.0 %    Eosinophil % 1.6 %    Basophil % 0.3 %    Immature Granulocyte % 0.3 %     *Note: Due to a large number of results and/or encounters for the requested time period, some results have not been displayed. A complete set of results can be found in Results Review.       Recent Results (from the past 8760 hours)   Comp Metabolic Panel (14) [E]    Collection Time: 02/12/24 10:22 AM   Result Value Ref Range    Glucose 84 70 - 99 mg/dL    Sodium 139 136 - 145 mmol/L    Potassium 4.0 3.5 - 5.1 mmol/L    Chloride 106 98 - 112 mmol/L    CO2 29.0 21.0 - 32.0 mmol/L    Anion Gap 4 0 - 18 mmol/L    BUN 12 9 - 23 mg/dL    Creatinine 0.71 0.70 - 1.30 mg/dL    BUN/CREA Ratio 16.9 10.0 - 20.0    Calcium, Total 9.4 8.7 - 10.4 mg/dL    Calculated Osmolality 287 275 - 295 mOsm/kg    eGFR-Cr 102 >=60 mL/min/1.73m2    ALT 30 10 - 49 U/L    AST 18 <=34 U/L    Alkaline Phosphatase 82 45 - 117 U/L    Bilirubin, Total 0.5 0.2 -  1.1 mg/dL    Total Protein 8.0 5.7 - 8.2 g/dL    Albumin 4.4 3.2 - 4.8 g/dL    Globulin  3.6 2.8 - 4.4 g/dL    A/G Ratio 1.2 1.0 - 2.0    Patient Fasting for CMP? Yes      *Note: Due to a large number of results and/or encounters for the requested time period, some results have not been displayed. A complete set of results can be found in Results Review.       Cholesterol, Total (mg/dL)   Date Value   02/12/2024 145   09/27/2011 287 (H)     Cholesterol (mg/dL)   Date Value   10/07/2021 179.00     HDL Cholesterol (mg/dL)   Date Value   02/12/2024 41   09/27/2011 56     Direct HDL (mg/dL)   Date Value   10/07/2021 49     LDL Cholesterol Calc (mg/dL)   Date Value   09/27/2011 207 (H)     LDL Cholesterol (mg/dL)   Date Value   02/12/2024 81     Calculated LDL (mg/dL)   Date Value   10/07/2021 121     Triglycerides (mg/dL)   Date Value   02/12/2024 128   10/07/2021 43.00   09/27/2011 120       Last A1c value was 5.8% done 11/9/2023.        Pathology:  Colonoscopy 11/23/2021  Final Diagnosis    A.  Descending polyp:  -Fragments of tubular adenoma.    B.  Rectal polyp:  -Fragments of tubular adenoma.              ASSESSMENT/PLAN:     Foot pain with paresthesias  Left foot seems to be accidental trauma, seen by podiatry with diagnosis of hammertoe  Right foot more so paresthesias with numbness and tingling.  Neurologically intact on examination  - We will check a metabolic work-up as below  - Podiatry evaluation reviewed.  Yayo try to use the splint/brace as directed with conventional footwear  - May benefit from the use of buddy taping when using his work boots  - Continue with conservative measures:    -Acetaminophen 500-650 mg every 4-6 hours as needed for pain relief  - Diclofenac 75 mg twice a day, filled to your pharmacy  -For more severe pain, notify us as we can consider alternative medications  - Last seen by orthopedic surgery, Dr. Albright 6/24/2024.  Ongoing use of diclofenac as needed.  Increase activity as  tolerated.  May consider use of gabapentin if symptoms do not improve.  -Was recommended to see neurology, referral for Dr. Casanova  -Amenable to trial of gabapentin 300 mg nightly.  He will check with ID OT to make sure that this is compatible and in compliance    HLD  NAFLD  Hx of HLD with NAFLD, likely related  - Last lipid panel remains at goal 2/12  - Repeat fasting lipid panel  - Target weight loss over time.  Has had success with this in the past with optimizing nutrition, working with a dietitian.  Ongoing efforts to continue at home  - Ongoing use of atorvastatin 20 mg nightly.    Prediabetes  Family history of diabetes.  - Check fasting sugar level, A1c  - On metformin  - Target weight loss over time  - Will start with phentermine 15 mg once a day.  He will check in with us when he is due for refill to see if we can increase to 30 mg.  Of note, he has tolerated 37.5 mg previously.    History of kidney stones  Hx of cystoscopy with kidney stone removal  -Continue with good water intake    Arthritis:   - Hx of b/l knee arthroplasty in 2012    Vitamin D Deficiency  - Check Vitamin D level  - Last vitamin D at goal 49.3.       Orders This Visit:  Orders Placed This Encounter   Procedures    Comp Metabolic Panel (14)    CBC With Differential With Platelet    Hemoglobin A1C    Lipid Panel    Vitamin B12    Vitamin D    TSH W Reflex To Free T4    INFLUENZA VAC HIGH DOSE PRSV FREE       Meds This Visit:  Requested Prescriptions     Signed Prescriptions Disp Refills    diclofenac 75 MG Oral Tab  tablet 3     Sig: Take 1 tablet (75 mg total) by mouth 2 (two) times daily as needed.    Phentermine HCl 15 MG Oral Cap 30 capsule 0     Sig: Take 1 capsule (15 mg total) by mouth every morning.       Imaging & Referrals:  NEURO - INTERNAL  INFLUENZA VAC HIGH DOSE PRSV FREE  GASTRO - INTERNAL       Health Maintenance  HTN Screen: BP at goal  DM Screen: Check A1c/fasting sugar  HLD Screen: Check fasting lipid panel  HCV  Screen: Considered low risk  HIV Screen: considered low risk  G/C/Syphilis: Considered low risk    Colon Cancer Screening (45-70): UTD with Dr. Leary - recommended 2024 recall.  Advised to make an appointment  Prostate Cancer Screening: (50-70): Check PSA  Lung Cancer Screening (55-79 with 30 p/year and active < 15 years): Not indicated  AAA Screening (65-75 Hx of smoking): Not indicated    Influenza: Annually   Td/Tdap: Last Tdap 2020, due 2030  Zoster (50+): Recommended 2 doses Shringrix  HPV (19-26): Not indicated  Pneumococcal: Due for Prevnar 20  Immunization History   Administered Date(s) Administered    >=3 YRS TRI  MULTIDOSE VIAL (95704) FLU CLINIC 10/22/2018, 10/04/2019    Covid-19 Vaccine Pfizer 30 mcg/0.3 ml 05/04/2021, 05/04/2021, 05/25/2021, 05/25/2021, 01/07/2022    Covid-19 Vaccine Pfizer Bivalent 30mcg/0.3mL 12/19/2022    Covid-19 Vaccine Pfizer Ac-Sucrose 30 mcg/0.3 ml 01/07/2021    FLU VAC QIV SPLIT 3 YRS AND OLDER (31695) 11/17/2017    FLUZONE 6 months and older PFS 0.5 ml (85523) 12/19/2022, 12/29/2023    Fluvirin, 3 Years & >, Im 09/27/2011    High Dose Fluzone Influenza Vaccine, 65yr+ PF 0.5mL (70819) 11/05/2024    Influenza 09/27/2011, 10/03/2019    Pneumococcal (Prevnar 13) 11/17/2017    Pneumovax 23 11/14/2018    TDAP 06/06/2007, 09/27/2011, 10/06/2021    Vitamin B-12 Up To 1000mcg, IM/SC Inj 02/03/2020, 03/05/2020, 08/10/2020, 12/04/2020   Deferred Date(s) Deferred    Influenza 11/13/2015         Return to clinic in 6 mo for follow-up    Masoud Parry MD, 11/05/24, 5:52 PM

## 2024-11-04 NOTE — PATIENT INSTRUCTIONS
- You were seen in clinic for regular annual check-up. We have ordered labs for you and we will call you with the results. Please obtain the bloodwork fasting for 10**12 hours. OK to drink water the day of your blood draw.      We have the lab downstairs on the first floor.  No appointment is necessary.  Lab hours are Monday-Friday 7:30 AM to 4:45 PM.  There may be Saturday hours 7 AM-11:00 AM as well.     Otherwise you can obtain the blood tests on the weekends at the main hospital or local immediate care/urgent care within the [facility].    -We will continued management of the foot pain:    -Acetaminophen 500-650 mg every 4-6 hours as needed for pain relief  - Diclofenac 75 mg twice a day, filled to your pharmacy  -For more severe pain, notify us as we can consider alternative medications  -Follow-up with Dr. Albright  He may benefit from use of dedicated nerve related medications    We are trying to rule out for any secondary causes of neuropathy/nerve related symptoms.  If everything comes back normal including our workup with Dr. Casanova, this may be related to intensive physical activity from prior  experience as well as sporting experiences.    Medications we can consider: Gabapentin, pregabalin/Lyrica.  Please check to make sure that this is acceptable within IDOT compliance    For urinary medications, we can consider Flomax/tamsulosin 0.4 mg nightly  - Notify us if this is necessary in the future    Will start first by managing constipation:    - Aggressive bowel regimen:     1.  Conservative measures include staying well-hydrated, eating high-fiber foods including fruits/vegetables.  2.  Use of Colace  3.  Addition of a fiber supplement/stool softener.  We recommend over-the-counter MiraLAX or Metamucil 1 dose in the morning  4.  If no improvement, add Senokot 1 tablet in the morning  5.  If still no improvement, you may need to double the dose of MiraLAX/Metamucil.  One dose in the morning and  1 dose in the evening  6.  If still no improvement, Take 2 tablets in the morning of Senokot  7. If still no improvement, would benefit from 1 dose of milk of magnesia     -May need to continue to consider additional GI evaluation if we cannot control symptoms further      -Please continue checking your blood pressures at home and notify us if they are increasing  - You are due for colonoscopy, please make an appointment with Dr. Leary  -Vaccines you may be due for: COVID dose #8, Prevnar 20/pneumonia shot, We recommended checking with your insurance for coverage of Shingrix, a 2-dose shingles vaccine that can be obtained at the pharmacy if there is adequate coverage through your insurance plan.    - Please continue to eat a varied diet including recommended servings of vegetables, fruits, and low fat dairy. Minimize high saturated fats (such as fast foods) and high sugar intake (such as soda)  - We recommend 150 minutes of moderate intensity exercise (brisk walking, swimming) weekly to maintain your current weight.  Targeted weight loss will require more vigorous exercise or more than 150 minutes/week.    Return to clinic in 6 months for follow-up

## 2024-11-05 ENCOUNTER — OFFICE VISIT (OUTPATIENT)
Dept: INTERNAL MEDICINE CLINIC | Facility: CLINIC | Age: 64
End: 2024-11-05

## 2024-11-05 VITALS
BODY MASS INDEX: 40.36 KG/M2 | HEIGHT: 68.5 IN | WEIGHT: 269.38 LBS | HEART RATE: 73 BPM | DIASTOLIC BLOOD PRESSURE: 74 MMHG | SYSTOLIC BLOOD PRESSURE: 126 MMHG | TEMPERATURE: 98 F | OXYGEN SATURATION: 96 %

## 2024-11-05 DIAGNOSIS — Z00.00 ANNUAL PHYSICAL EXAM: Primary | ICD-10-CM

## 2024-11-05 DIAGNOSIS — T46.6X5A ADVERSE EFFECT OF STATIN: ICD-10-CM

## 2024-11-05 DIAGNOSIS — E55.9 VITAMIN D DEFICIENCY: ICD-10-CM

## 2024-11-05 DIAGNOSIS — Z13.29 SCREENING FOR THYROID DISORDER: ICD-10-CM

## 2024-11-05 DIAGNOSIS — Z12.11 SCREENING FOR COLON CANCER: ICD-10-CM

## 2024-11-05 DIAGNOSIS — R73.03 PREDIABETES: ICD-10-CM

## 2024-11-05 DIAGNOSIS — R20.2 PARESTHESIAS: ICD-10-CM

## 2024-11-05 DIAGNOSIS — E78.2 MIXED HYPERLIPIDEMIA: ICD-10-CM

## 2024-11-05 DIAGNOSIS — Z12.5 SCREENING FOR PROSTATE CANCER: ICD-10-CM

## 2024-11-05 DIAGNOSIS — K76.0 NAFLD (NONALCOHOLIC FATTY LIVER DISEASE): ICD-10-CM

## 2024-11-05 DIAGNOSIS — Z13.0 SCREENING FOR DEFICIENCY ANEMIA: ICD-10-CM

## 2024-11-05 DIAGNOSIS — Z76.89 ENCOUNTER FOR WEIGHT MANAGEMENT: ICD-10-CM

## 2024-11-05 DIAGNOSIS — Z83.3 FAMILY HISTORY OF DIABETES MELLITUS: ICD-10-CM

## 2024-11-05 PROCEDURE — 90662 IIV NO PRSV INCREASED AG IM: CPT | Performed by: INTERNAL MEDICINE

## 2024-11-05 PROCEDURE — 99397 PER PM REEVAL EST PAT 65+ YR: CPT | Performed by: INTERNAL MEDICINE

## 2024-11-05 PROCEDURE — 3008F BODY MASS INDEX DOCD: CPT | Performed by: INTERNAL MEDICINE

## 2024-11-05 PROCEDURE — 3078F DIAST BP <80 MM HG: CPT | Performed by: INTERNAL MEDICINE

## 2024-11-05 PROCEDURE — 90471 IMMUNIZATION ADMIN: CPT | Performed by: INTERNAL MEDICINE

## 2024-11-05 PROCEDURE — 3074F SYST BP LT 130 MM HG: CPT | Performed by: INTERNAL MEDICINE

## 2024-11-05 RX ORDER — DICLOFENAC SODIUM 75 MG/1
75 TABLET, DELAYED RELEASE ORAL 2 TIMES DAILY PRN
Qty: 180 TABLET | Refills: 3 | Status: SHIPPED | OUTPATIENT
Start: 2024-11-05

## 2024-11-05 RX ORDER — GABAPENTIN 300 MG/1
300 CAPSULE ORAL NIGHTLY
Qty: 90 CAPSULE | Refills: 3 | Status: SHIPPED | OUTPATIENT
Start: 2024-11-05

## 2024-11-05 RX ORDER — PHENTERMINE HYDROCHLORIDE 15 MG/1
15 CAPSULE ORAL EVERY MORNING
Qty: 30 CAPSULE | Refills: 0 | Status: SHIPPED | OUTPATIENT
Start: 2024-11-05

## 2024-11-06 ENCOUNTER — TELEPHONE (OUTPATIENT)
Dept: INTERNAL MEDICINE CLINIC | Facility: CLINIC | Age: 64
End: 2024-11-06

## 2024-11-06 NOTE — TELEPHONE ENCOUNTER
Approval received for     Phentermine HCI 15 MG     from [facility]    Approved from 11/6/2024 to 2/4/25    Placed in red folder

## 2024-11-07 ENCOUNTER — LAB ENCOUNTER (OUTPATIENT)
Dept: LAB | Facility: HOSPITAL | Age: 64
End: 2024-11-07
Attending: INTERNAL MEDICINE
Payer: COMMERCIAL

## 2024-11-07 DIAGNOSIS — R20.2 PARESTHESIAS: ICD-10-CM

## 2024-11-07 DIAGNOSIS — Z13.29 SCREENING FOR THYROID DISORDER: ICD-10-CM

## 2024-11-07 DIAGNOSIS — R73.03 PREDIABETES: ICD-10-CM

## 2024-11-07 DIAGNOSIS — E55.9 VITAMIN D DEFICIENCY: ICD-10-CM

## 2024-11-07 DIAGNOSIS — Z13.0 SCREENING FOR DEFICIENCY ANEMIA: ICD-10-CM

## 2024-11-07 DIAGNOSIS — E78.2 MIXED HYPERLIPIDEMIA: ICD-10-CM

## 2024-11-07 DIAGNOSIS — Z00.00 ANNUAL PHYSICAL EXAM: ICD-10-CM

## 2024-11-07 LAB
ALBUMIN SERPL-MCNC: 4.4 G/DL (ref 3.2–4.8)
ALBUMIN/GLOB SERPL: 1.3 {RATIO} (ref 1–2)
ALP LIVER SERPL-CCNC: 100 U/L
ALT SERPL-CCNC: 39 U/L
ANION GAP SERPL CALC-SCNC: 6 MMOL/L (ref 0–18)
AST SERPL-CCNC: 22 U/L (ref ?–34)
BASOPHILS # BLD AUTO: 0.03 X10(3) UL (ref 0–0.2)
BASOPHILS NFR BLD AUTO: 0.5 %
BILIRUB SERPL-MCNC: 0.7 MG/DL (ref 0.2–1.1)
BUN BLD-MCNC: 16 MG/DL (ref 9–23)
BUN/CREAT SERPL: 21.9 (ref 10–20)
CALCIUM BLD-MCNC: 9.7 MG/DL (ref 8.7–10.4)
CHLORIDE SERPL-SCNC: 105 MMOL/L (ref 98–112)
CHOLEST SERPL-MCNC: 153 MG/DL (ref ?–200)
CO2 SERPL-SCNC: 28 MMOL/L (ref 21–32)
CREAT BLD-MCNC: 0.73 MG/DL
DEPRECATED RDW RBC AUTO: 45.9 FL (ref 35.1–46.3)
EGFRCR SERPLBLD CKD-EPI 2021: 101 ML/MIN/1.73M2 (ref 60–?)
EOSINOPHIL # BLD AUTO: 0.21 X10(3) UL (ref 0–0.7)
EOSINOPHIL NFR BLD AUTO: 3.3 %
ERYTHROCYTE [DISTWIDTH] IN BLOOD BY AUTOMATED COUNT: 13.3 % (ref 11–15)
EST. AVERAGE GLUCOSE BLD GHB EST-MCNC: 126 MG/DL (ref 68–126)
FASTING PATIENT LIPID ANSWER: YES
FASTING STATUS PATIENT QL REPORTED: YES
GLOBULIN PLAS-MCNC: 3.3 G/DL (ref 2–3.5)
GLUCOSE BLD-MCNC: 92 MG/DL (ref 70–99)
HBA1C MFR BLD: 6 % (ref ?–5.7)
HCT VFR BLD AUTO: 42.8 %
HDLC SERPL-MCNC: 44 MG/DL (ref 40–59)
HGB BLD-MCNC: 14.7 G/DL
IMM GRANULOCYTES # BLD AUTO: 0.01 X10(3) UL (ref 0–1)
IMM GRANULOCYTES NFR BLD: 0.2 %
LDLC SERPL CALC-MCNC: 93 MG/DL (ref ?–100)
LYMPHOCYTES # BLD AUTO: 1.5 X10(3) UL (ref 1–4)
LYMPHOCYTES NFR BLD AUTO: 23.9 %
MCH RBC QN AUTO: 32 PG (ref 26–34)
MCHC RBC AUTO-ENTMCNC: 34.3 G/DL (ref 31–37)
MCV RBC AUTO: 93.2 FL
MONOCYTES # BLD AUTO: 0.61 X10(3) UL (ref 0.1–1)
MONOCYTES NFR BLD AUTO: 9.7 %
NEUTROPHILS # BLD AUTO: 3.92 X10 (3) UL (ref 1.5–7.7)
NEUTROPHILS # BLD AUTO: 3.92 X10(3) UL (ref 1.5–7.7)
NEUTROPHILS NFR BLD AUTO: 62.4 %
NONHDLC SERPL-MCNC: 109 MG/DL (ref ?–130)
OSMOLALITY SERPL CALC.SUM OF ELEC: 289 MOSM/KG (ref 275–295)
PLATELET # BLD AUTO: 283 10(3)UL (ref 150–450)
POTASSIUM SERPL-SCNC: 4.2 MMOL/L (ref 3.5–5.1)
PROT SERPL-MCNC: 7.7 G/DL (ref 5.7–8.2)
RBC # BLD AUTO: 4.59 X10(6)UL
SODIUM SERPL-SCNC: 139 MMOL/L (ref 136–145)
TRIGL SERPL-MCNC: 85 MG/DL (ref 30–149)
TSI SER-ACNC: 3.44 UIU/ML (ref 0.55–4.78)
VIT B12 SERPL-MCNC: 1902 PG/ML (ref 211–911)
VIT D+METAB SERPL-MCNC: 58.2 NG/ML (ref 30–100)
VLDLC SERPL CALC-MCNC: 14 MG/DL (ref 0–30)
WBC # BLD AUTO: 6.3 X10(3) UL (ref 4–11)

## 2024-11-07 PROCEDURE — 84443 ASSAY THYROID STIM HORMONE: CPT

## 2024-11-07 PROCEDURE — 82306 VITAMIN D 25 HYDROXY: CPT

## 2024-11-07 PROCEDURE — 36415 COLL VENOUS BLD VENIPUNCTURE: CPT

## 2024-11-07 PROCEDURE — 82607 VITAMIN B-12: CPT

## 2024-11-07 PROCEDURE — 80053 COMPREHEN METABOLIC PANEL: CPT

## 2024-11-07 PROCEDURE — 85025 COMPLETE CBC W/AUTO DIFF WBC: CPT

## 2024-11-07 PROCEDURE — 83036 HEMOGLOBIN GLYCOSYLATED A1C: CPT

## 2024-11-07 PROCEDURE — 80061 LIPID PANEL: CPT

## 2024-11-08 ENCOUNTER — TELEPHONE (OUTPATIENT)
Dept: INTERNAL MEDICINE CLINIC | Facility: CLINIC | Age: 64
End: 2024-11-08

## 2024-11-08 NOTE — TELEPHONE ENCOUNTER
Please refer the patient reviewed the blood work from 11/7  - Hemoglobin A1c increased slightly from a year ago from 5.8 now 6.0    - Vitamin B12 is elevated, would recommend decreasing the vitamin B12 supplements taking only Monday through Friday and skipping on the weekends.  This will keep the vitamin B12 at a good level.    - Cholesterol, thyroid, blood counts all look good    No other new recommendations for now

## 2024-11-11 RX ORDER — CYANOCOBALAMIN (VITAMIN B-12) 2500 MCG
TABLET, SUBLINGUAL SUBLINGUAL
COMMUNITY
Start: 2024-11-11

## 2024-12-03 ENCOUNTER — TELEPHONE (OUTPATIENT)
Dept: INTERNAL MEDICINE CLINIC | Facility: CLINIC | Age: 64
End: 2024-12-03

## 2024-12-03 DIAGNOSIS — Z76.89 ENCOUNTER FOR WEIGHT MANAGEMENT: ICD-10-CM

## 2024-12-03 RX ORDER — PHENTERMINE HYDROCHLORIDE 15 MG/1
15 CAPSULE ORAL EVERY MORNING
Qty: 30 CAPSULE | Refills: 0 | Status: SHIPPED | OUTPATIENT
Start: 2024-12-03

## 2024-12-03 NOTE — TELEPHONE ENCOUNTER
To MD:  The above refill request is for a controlled substance.  Please review pended medication order.   Print and sign for staff to fax to pharmacy or prescribe electronically.    Last office visit: 11/5/2024  Last time refill sent and quantity/refills: 11/5/2024 #30

## 2024-12-11 ENCOUNTER — TELEPHONE (OUTPATIENT)
Dept: INTERNAL MEDICINE CLINIC | Facility: CLINIC | Age: 64
End: 2024-12-11

## 2024-12-11 NOTE — TELEPHONE ENCOUNTER
Pt. 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

## 2024-12-13 ENCOUNTER — TELEPHONE (OUTPATIENT)
Dept: INTERNAL MEDICINE CLINIC | Facility: CLINIC | Age: 64
End: 2024-12-13

## 2024-12-13 RX ORDER — AZITHROMYCIN 250 MG/1
TABLET, FILM COATED ORAL
Qty: 6 TABLET | Refills: 0 | Status: SHIPPED | OUTPATIENT
Start: 2024-12-13 | End: 2024-12-18

## 2024-12-13 NOTE — TELEPHONE ENCOUNTER
To Dr Parry,  Please Advise    Request Z adebayo for complaints of cold symptoms X 1 week.  Reports expectorating yellow phlegm, denies post nasal drip, fever, shortness of breath, wheezing, fatigue.  Reports being exposed to co-workers with same symptoms.

## 2024-12-13 NOTE — TELEPHONE ENCOUNTER
Please notify the patient that I completed the letter as requested.  Can either pick it up or we can mail it to his residence.  Signed copy in my outbox

## 2024-12-13 NOTE — TELEPHONE ENCOUNTER
Patient needs letter for Monday but unable to come till 330p today.    Letter left at 1st floor Registration desk with [employer].  Patient aware.

## 2024-12-13 NOTE — TELEPHONE ENCOUNTER
For sinus infection and upper respiratory infection, we can proceed with azithromycin 2 tablets today, 1 tablet the next 4 days     - Trial of flonase 1 spray each nostril until symptoms improve

## 2025-01-06 DIAGNOSIS — Z76.89 ENCOUNTER FOR WEIGHT MANAGEMENT: ICD-10-CM

## 2025-01-06 RX ORDER — PHENTERMINE HYDROCHLORIDE 30 MG/1
30 CAPSULE ORAL EVERY MORNING
Qty: 30 CAPSULE | Refills: 0 | Status: SHIPPED | OUTPATIENT
Start: 2025-01-06

## 2025-01-06 NOTE — TELEPHONE ENCOUNTER
To Dr. BHANDARI    The above refill request is for a controlled substance.  Please review pended medication order.  LOV: 11/24  Prescribed: 30 12/3  : \"

## 2025-01-06 NOTE — TELEPHONE ENCOUNTER
Lets check in with the patient to see if the phentermine has been helping with weight loss.  If he is having any symptoms/side effects of the phentermine.  Would like to increase his dosage to 30 mg if tolerable

## 2025-01-29 DIAGNOSIS — Z76.89 ENCOUNTER FOR WEIGHT MANAGEMENT: ICD-10-CM

## 2025-01-29 NOTE — TELEPHONE ENCOUNTER
To MD:  The above refill request is for a controlled substance.  Please review pended medication order.   Print and sign for staff to fax to pharmacy or prescribe electronically.    Last office visit:   11/5/24  Last time refill sent and quantity/refills:   1/6/25   #30/0

## 2025-01-30 RX ORDER — PHENTERMINE HYDROCHLORIDE 30 MG/1
30 CAPSULE ORAL EVERY MORNING
Qty: 30 CAPSULE | Refills: 0 | Status: SHIPPED | OUTPATIENT
Start: 2025-01-30

## 2025-02-18 RX ORDER — ATORVASTATIN CALCIUM 20 MG/1
20 TABLET, FILM COATED ORAL NIGHTLY
Qty: 90 TABLET | Refills: 3 | Status: SHIPPED | OUTPATIENT
Start: 2025-02-18

## 2025-02-18 NOTE — TELEPHONE ENCOUNTER
Refill request is for a maintenance medication and has met the criteria specified in the Ambulatory Medication Refill Standing Order for eligibility, visits, laboratory, alerts and was sent to the requested pharmacy.    Requested Prescriptions     Signed Prescriptions Disp Refills    atorvastatin 20 MG Oral Tab 90 tablet 3     Sig: Take 1 tablet (20 mg total) by mouth nightly.     Authorizing Provider: MIREILLE POOLE     Ordering User: CLARI FUNK

## 2025-03-17 DIAGNOSIS — Z76.89 ENCOUNTER FOR WEIGHT MANAGEMENT: ICD-10-CM

## 2025-03-17 RX ORDER — PHENTERMINE HYDROCHLORIDE 30 MG/1
30 CAPSULE ORAL EVERY MORNING
Qty: 30 CAPSULE | Refills: 0 | Status: CANCELLED | OUTPATIENT
Start: 2025-03-17

## 2025-03-18 ENCOUNTER — TELEPHONE (OUTPATIENT)
Dept: INTERNAL MEDICINE CLINIC | Facility: CLINIC | Age: 65
End: 2025-03-18

## 2025-03-18 DIAGNOSIS — Z76.89 ENCOUNTER FOR WEIGHT MANAGEMENT: ICD-10-CM

## 2025-03-19 RX ORDER — PHENTERMINE HYDROCHLORIDE 30 MG/1
30 CAPSULE ORAL EVERY MORNING
Qty: 30 CAPSULE | Refills: 3 | Status: SHIPPED | OUTPATIENT
Start: 2025-03-19

## 2025-03-19 NOTE — TELEPHONE ENCOUNTER
ePA approved:  Prior Authorization History  Phentermine HCl 30 MG Oral Cap     Approval Details    Authorized from March 19, 2025 to June 19, 2025  Information received electronically from payer     History    View all authorizations for this medication  Approved   3/19/2025  4:47 PM  Appeal supported: No   Note from payer: Your PA request has been approved.  Additional information will be provided in the approval communication. (Message 1145)   Payer: [facility] Case ID: [id number]

## 2025-03-19 NOTE — TELEPHONE ENCOUNTER
ePA submitted for Phentermine:    Waiting for Payer Response   3/19/2025  3:53 PM  Deadline to reply: March 25, 2025  8:35 AM Sending user: Gerri Mcadams RN   Note from payer: Please answer the provided questions and return this form when complete. (Message [id number]) - Prescriber details have been updated to match the prescriber directory.   Note to payer: BMI 40.36, dx: Z68.41   Payer: [facility] [employer] Case ID: [id number]

## 2025-03-19 NOTE — TELEPHONE ENCOUNTER
Duplicate - Phentermine rx ordered today.   
correction   Long Term Goals: To be accomplished in 14 treatments.  Patient will demonstrate Grade IV or Grade V Hruska Adduction Lift Score to allow for ambulation x 500 ft and mobility in home and community settings x 250 ft  Pt will demonstrate shoulder, neck and hip A/PROM bilaterally equal and without pain to allow for overhead reaching to a high shelf and a sit to stand transfer without UE assistance needed  Pt will demonstrate the ability to run without pain for exercise needed for cardiovascular health as evidenced by the achievement of Level 4-5 HADLT bilaterally objective measure   Pt will demonstrate independence with discharge HEP, full AROM UE and LE, a min of 4/5 MMT to allow for ambulation, sitting and standing as required for all ADL and hygienic self care skills without objective dysfunction or assistance needed     Frequency / Duration: Patient to be seen 1 times per week for up to 14 treatments.    Patient/ Caregiver education and instruction: Diagnosis, prognosis, self care, activity modification, and exercises   [x]  Plan of care has been reviewed with ROSENDA Abbasi, PT, DPT, [facility]       4/23/2024       9:43 AM        ===================================================================  I certify that the above Therapy Services are being furnished while the patient is under my care. I agree with the treatment plan and certify that this therapy is necessary.    Physician's Signature:_________________________   DATE:_________   TIME:________                           Binu Sutton MD    ** Signature, Date and Time must be completed for valid certification **  Please sign and fax to [phone].  Thank you

## 2025-03-19 NOTE — TELEPHONE ENCOUNTER
To MD:  The above refill request is for a controlled substance.  Please review pended medication order.   Print and sign for staff to fax to pharmacy or prescribe electronically.    Last office visit:11/5/24  Last time refill sent and quantity/refills:2/2/25 # 30    To

## 2025-05-04 NOTE — PROGRESS NOTES
Steve Thompson is a 64 year old male.    HPI:     Chief Complaint   Patient presents with    Checkup       Mr. THOMPSON is a 64 year old male PMHX HLD, Prediabetes, arthritis who presents for follow-up    Feeling good. He has been counting his calories. ON a [facility] program with an lara. Seeing a nutritionist.  Down to 1500 kcal.   - Salad, eggs with turkey sausage. At night, fish some chicken. Veggies.  - Portion sizes even when eating out  - Drinking water, 2 beers a week  - Nothing past 4 o'clock.    He was able to lose this weight before 280 down to 220    The neuropathy of the feet is better, some cold feet at night. Not as painful.  The hammertoe is easing up. 20 lb dumbbells and flies. Twisties and frequent walking,      HISTORY:  Past Medical History:    Allergic rhinitis    hay fever    Kidney stone    Osteoarthrosis, unspecified whether generalized or localized, unspecified site    Other and unspecified hyperlipidemia      Past Surgical History:   Procedure Laterality Date    Colonoscopy  11/2015    adenomas, diverticulosis. repeat 3 years    Colonoscopy,biopsy  7/17/09    Performed by BRIGIDO MELCHOR at [facility]    Colonoscopy,remv lesn,snare  7/17/09    Performed by BRIGIDO MELCHOR at [facility]    Hernia surgery  06/17/2021    Robotic assisted repair of venteral hernia w/mesh    Other surgical history      bilatera carpal tunnel release    Other surgical history      repair hole in right ear drum    Other surgical history Left     elbow    Total knee replacement Bilateral     Upper gi endoscopy,biopsy  7/17/09    Performed by BRIGIDO MELCHOR at [facility]      Family History   Problem Relation Age of Onset    Diabetes Father     Hypertension Mother       Social History:   Social History     Socioeconomic History    Marital status:    Tobacco Use    Smoking status: Never    Smokeless tobacco: Never   Vaping Use    Vaping status: Never Used   Substance and  Sexual Activity    Alcohol use: Yes     Alcohol/week: 6.0 standard drinks of alcohol     Types: 6 Cans of beer per week     Comment: SOCIAL    Drug use: No    Sexual activity: Yes        Medications (Active prior to today's visit):  Current Outpatient Medications   Medication Sig Dispense Refill    fluticasone propionate 50 MCG/ACT Nasal Suspension 1 spray by Nasal route in the morning and 1 spray before bedtime. 18.2 mL 1    Phentermine HCl 30 MG Oral Cap TAKE ONE CAPSULE BY MOUTH EVERY MORNING 30 capsule 3    atorvastatin 20 MG Oral Tab Take 1 tablet (20 mg total) by mouth nightly. 90 tablet 3    Cyanocobalamin (VITAMIN B-12) 2500 MCG Sublingual SL Tab Take Monday  through Friday      diclofenac 75 MG Oral Tab EC Take 1 tablet (75 mg total) by mouth 2 (two) times daily as needed. 180 tablet 3    gabapentin 300 MG Oral Cap Take 1 capsule (300 mg total) by mouth nightly. 90 capsule 3    Multiple Vitamins-Minerals (CENTRUM SILVER 50+MEN OR) Take 1 tablet by mouth daily.      cholecalciferol 125 MCG (5000 UT) Oral Tab Take 1 tablet (5,000 Units total) by mouth daily.         Allergies:  Allergies   Allergen Reactions    Penicillins RASH and HIVES    Seasonal ITCHING         ROS:   Positive Findings indicated in BOLD    Constitutional: Fever, Chills, Weight Gain, Weight Loss, Night Sweats, Fatigue, Malaise  ENT/Mouth:  Hearing Changes, Ear Pain, Nasal Congestion, Sinus Pain, Hoarseness, Sore throat, Rhinorrhea, Swallowing Difficulty  Eyes: Eye Pain, Swelling, Redness, Foreign Body, Discharge, Vision Changes  Cardiovascular: Chest Pain, SOB, PND, Dyspnea on Exertion, Orthopnea, Claudication, Edema, Palpitations  Respiratory: Cough, Sputum, Wheezing, Shortness of breath  Gastrointestinal: Nausea, Vomiting, Diarrhea, Constipation, Pain, Heartburn, Dysphagia, Bloody stools, Tarry stools  Genitourinary: Dysmenorrhea, Dysuria, Urinary Frequency, Hematuria, Urinary Incontinence, Urgency,  Flank Pain  Musculoskeletal:  Arthralgias, Myalgias, Joint Swelling, Joint Stiffness, Back Pain, Neck Pain, foot pain  Integumentary: Skin Lesions, Pruritis, Hair Changes, Jaundice, Nail changes  Neuro: Weakness, Numbness, Paresthesias, Loss of Consciousness, Syncope, Dizziness, Headache, Falls  Psych: Anxiety, Depression, Insomnia, Suicidal Ideation, Homicidal ideation, Memory Changes  Heme/Lymph: Bruising, Bleeding, Lymphadenopathy  Endocrine: Polyuria, Polydipsia, Temperature Intolerance    PHYSICAL EXAM:   Vital Signs:  Blood pressure 130/80, pulse 72, temperature 98 °F (36.7 °C), temperature source Oral, weight 226 lb (102.5 kg).     Constitutional: No acute distress. Alert and oriented x 3.  Eyes: EOMI, PERRLA, clear sclera b/l  HENT: NCAT, Moist mucous membranes, Oropharynx without erythema or exudates  Neck: No JVD, no thyromegaly  Cardiovascular: S1, S2, no S3, no S4, Regular rate and rhythm, No murmurs/gallops/rubs.   Vascular: Equal pulses 2+ carotids no bruits or thrills/radial/DP/PT bilaterally  Respiratory: Clear to auscultation bilaterally.  No wheezes/rales/rhonchi  Gastrointestinal: Soft, nontender, nondistended. Positive bowel sounds x 4. No rebound tenderness. No hepatomegaly, No splenomegaly  Genitourinary: No CVA tenderness bilaterally  Neurologic: No focal neurological deficits, CN II-XII intact, light touch intact, MSK Strength 5/5 and symmetric in all extremities, normal gait, 2+ patellar tendon  Musculoskeletal: Full range of motion of all extremities, no clubbing/swelling/edema  Skin: No lesions, No erythema, no jaundice, Cap Refill < 2s  Psychiatric: Appropriate mood and affect  Heme/Lymph/Immune: No cervical LAD      DATA REVIEWED   Labs:  Recent Results (from the past 8760 hours)   CBC With Differential With Platelet    Collection Time: 11/07/24  6:33 AM   Result Value Ref Range    WBC 6.3 4.0 - 11.0 x10(3) uL    RBC 4.59 3.80 - 5.80 x10(6)uL    HGB 14.7 13.0 - 17.5 g/dL    HCT 42.8 39.0 - 53.0 %    MCV 93.2 80.0 -  100.0 fL    MCH 32.0 26.0 - 34.0 pg    MCHC 34.3 31.0 - 37.0 g/dL    RDW-SD 45.9 35.1 - 46.3 fL    RDW 13.3 11.0 - 15.0 %    .0 150.0 - 450.0 10(3)uL    Neutrophil Absolute Prelim 3.92 1.50 - 7.70 x10 (3) uL    Neutrophil Absolute 3.92 1.50 - 7.70 x10(3) uL    Lymphocyte Absolute 1.50 1.00 - 4.00 x10(3) uL    Monocyte Absolute 0.61 0.10 - 1.00 x10(3) uL    Eosinophil Absolute 0.21 0.00 - 0.70 x10(3) uL    Basophil Absolute 0.03 0.00 - 0.20 x10(3) uL    Immature Granulocyte Absolute 0.01 0.00 - 1.00 x10(3) uL    Neutrophil % 62.4 %    Lymphocyte % 23.9 %    Monocyte % 9.7 %    Eosinophil % 3.3 %    Basophil % 0.5 %    Immature Granulocyte % 0.2 %     *Note: Due to a large number of results and/or encounters for the requested time period, some results have not been displayed. A complete set of results can be found in Results Review.       Recent Results (from the past 8760 hours)   Comp Metabolic Panel (14)    Collection Time: 11/07/24  6:33 AM   Result Value Ref Range    Glucose 92 70 - 99 mg/dL    Sodium 139 136 - 145 mmol/L    Potassium 4.2 3.5 - 5.1 mmol/L    Chloride 105 98 - 112 mmol/L    CO2 28.0 21.0 - 32.0 mmol/L    Anion Gap 6 0 - 18 mmol/L    BUN 16 9 - 23 mg/dL    Creatinine 0.73 0.70 - 1.30 mg/dL    BUN/CREA Ratio 21.9 (H) 10.0 - 20.0    Calcium, Total 9.7 8.7 - 10.4 mg/dL    Calculated Osmolality 289 275 - 295 mOsm/kg    eGFR-Cr 101 >=60 mL/min/1.73m2    ALT 39 10 - 49 U/L    AST 22 <34 U/L    Alkaline Phosphatase 100 45 - 117 U/L    Bilirubin, Total 0.7 0.2 - 1.1 mg/dL    Total Protein 7.7 5.7 - 8.2 g/dL    Albumin 4.4 3.2 - 4.8 g/dL    Globulin  3.3 2.0 - 3.5 g/dL    A/G Ratio 1.3 1.0 - 2.0    Patient Fasting for CMP? Yes      *Note: Due to a large number of results and/or encounters for the requested time period, some results have not been displayed. A complete set of results can be found in Results Review.       Cholesterol, Total (mg/dL)   Date Value   11/07/2024 153   09/27/2011 287 (H)      Cholesterol (mg/dL)   Date Value   10/07/2021 179.00     HDL Cholesterol (mg/dL)   Date Value   11/07/2024 44   09/27/2011 56     Direct HDL (mg/dL)   Date Value   10/07/2021 49     LDL Cholesterol Calc (mg/dL)   Date Value   09/27/2011 207 (H)     LDL Cholesterol (mg/dL)   Date Value   11/07/2024 93     Calculated LDL (mg/dL)   Date Value   10/07/2021 121     Triglycerides (mg/dL)   Date Value   11/07/2024 85   10/07/2021 43.00   09/27/2011 120       Last A1c value was 6% done 11/7/2024.        Pathology:  Colonoscopy 2/19/2025    Descending colon polyps x3:  -Tubular adenoma.  -Strips of unremarkable colonic mucosa.  -Negative for malignancy.       ASSESSMENT/PLAN:     Foot pain with paresthesias  Left foot seems to be accidental trauma, seen by podiatry with diagnosis of hammertoe  Right foot more so paresthesias with numbness and tingling.  Neurologically intact on examination  - We will check a metabolic work-up as below  - Podiatry evaluation reviewed.  Yayo try to use the splint/brace as directed with conventional footwear  - May benefit from the use of buddy taping when using his work boots  - Continue with conservative measures:    -Acetaminophen 500-650 mg every 4-6 hours as needed for pain relief  - Diclofenac 75 mg twice a day, filled to your pharmacy  -For more severe pain, notify us as we can consider alternative medications  - Last seen by orthopedic surgery, Dr. Albright 6/24/2024.  Ongoing use of diclofenac as needed.  Increase activity as tolerated.  May consider use of gabapentin if symptoms do not improve.  -Was recommended to see neurology, referral for Dr. Casanova  -Amenable to trial of gabapentin 300 mg nightly.  He will check with ID OT to make sure that this is compatible and in compliance    Since target weight loss, foot pain, paresthesias, and to a degree his hammertoes seem to have improved with regards to symptom tolerance.    HLD  NAFLD  Hx of HLD with NAFLD, likely related  - Last lipid  panel remains at goal   - Repeat fasting lipid panel  - Target weight loss over time.  Has had success with this in the past with optimizing nutrition, working with a dietitian.  Ongoing efforts to continue at home  - Ongoing use of atorvastatin 20 mg nightly.    Prediabetes  Family history of diabetes.  - Hemoglobin A1c 6.0%, plan to repeat  - On metformin  - Target weight loss over time.  Congratulated the patient on his progress  - Stable on phentermine for 30 mg.  Of note, he has tolerated 37.5 mg previously.  Will maintain the current dosage for now    History of kidney stones  Hx of cystoscopy with kidney stone removal  -Continue with good water intake    History of colon polyps  - Tubular adenoma x 3, negative for malignancy  - Likely needs repeat colonoscopy within 3-5 years    Arthritis:   - Hx of b/l knee arthroplasty in     Vitamin D Deficiency  - Check Vitamin D level  - Last vitamin D at goal 49.3.    Weight management  - Doing excellent going down from 2 72-26  - Will maintain the phentermine 30 mg once a day, tolerating it well  - His preference is to repeat blood test being off of medications.  Reasonable, but if the blood cholesterol remains elevated, he needs to maintain       Orders This Visit:  Orders Placed This Encounter   Procedures    CBC With Differential With Platelet    Comp Metabolic Panel (14)    Hemoglobin A1C    Lipid Panel    TSH W Reflex To Free T4    Vitamin B12       Meds This Visit:  Requested Prescriptions     Signed Prescriptions Disp Refills    fluticasone propionate 50 MCG/ACT Nasal Suspension 18.2 mL 1     Si spray by Nasal route in the morning and 1 spray before bedtime.       Imaging & Referrals:  None       Health Maintenance  HTN Screen: BP at goal  DM Screen: Check A1c/fasting sugar  HLD Screen: Check fasting lipid panel  HCV Screen: Considered low risk  HIV Screen: considered low risk  G/C/Syphilis: Considered low risk    Colon Cancer Screening (45-70): UTD  with Dr. Leary - recommended 2024 recall.  Advised to make an appointment  Prostate Cancer Screening: (50-70): Check PSA  Lung Cancer Screening (55-79 with 30 p/year and active < 15 years): Not indicated  AAA Screening (65-75 Hx of smoking): Not indicated    Influenza: Annually   Td/Tdap: Last Tdap 2020, due 2030  Zoster (50+): Recommended 2 doses Shringrix  HPV (19-26): Not indicated  Pneumococcal: Due for Prevnar 20  Immunization History   Administered Date(s) Administered    >=3 YRS TRI  MULTIDOSE VIAL (54867) FLU CLINIC 10/22/2018, 10/04/2019    Covid-19 Vaccine Pfizer 30 mcg/0.3 ml 05/04/2021, 05/04/2021, 05/25/2021, 05/25/2021, 01/07/2022    Covid-19 Vaccine Pfizer Bivalent 30mcg/0.3mL 12/19/2022    Covid-19 Vaccine Pfizer Ac-Sucrose 30 mcg/0.3 ml 01/07/2021    FLU VAC QIV SPLIT 3 YRS AND OLDER (93253) 11/17/2017    FLUZONE 6 months and older PFS 0.5 ml (66793) 12/19/2022, 12/29/2023    Fluvirin, 3 Years & >, Im 09/27/2011    High Dose Fluzone Influenza Vaccine, 65yr+ PF 0.5mL (14154) 11/05/2024    Influenza 09/27/2011, 10/03/2019    Pneumococcal (Prevnar 13) 11/17/2017    Pneumovax 23 11/14/2018    TDAP 06/06/2007, 09/27/2011, 10/06/2021    Vitamin B-12 Up To 1000mcg, IM/SC Inj 02/03/2020, 03/05/2020, 08/10/2020, 12/04/2020   Deferred Date(s) Deferred    Influenza 11/13/2015         Return to clinic in 6 mo for annual physical examination    Spent 30 minutes obtaining history, evaluating patient, discussing treatment options, diet, exercise, review of available labs and radiology reports, and completing documentation.     Masoud Parry MD, 05/08/25, 10:12 PM

## 2025-05-04 NOTE — PATIENT INSTRUCTIONS
- You were seen in clinic for regular annual check-up. We have ordered labs for you and we will call you with the results. Please obtain the bloodwork fasting for 10**12 hours. OK to drink water the day of your blood draw.      We have the lab downstairs on the first floor.  No appointment is necessary.  Lab hours are Monday-Friday 7:30 AM to 4:45 PM.  There may be Saturday hours 7 AM-11:00 AM as well.     Otherwise you can obtain the blood tests on the weekends at the main hospital or local immediate care/urgent care within the [facility].    Lets obtain the fasting blood work off of the atorvastatin for 2 weeks to see if we can get an accurate reading without the use of medications.  If this is well-controlled, it is reasonable to forego these medications moving forward.    We have done well with target weight loss with the use of phentermine.  -There is room to go up 37.5 mg, if needed  -We discussed the potential side effects of long-term stimulant use which include high blood pressure, chest pain, palpitations, unintended weight loss due to poor appetite, insomnia, increased anxiety.  We should do periodic check ins to make sure that it is still safe to continue stimulant medication.  The goal is to control symptoms while minimizing side effects with the lowest effective dose and the shortest duration of time.  - Whenever you have reached your health goals, okay to start weaning down on the phentermine.  Just be careful with rebound appetite, rebound weight gain and you can wean as long as needed      -We will continued management of the foot pain:    -Acetaminophen 500-650 mg every 4-6 hours as needed for pain relief  - Diclofenac 75 mg twice a day, filled to your pharmacy  -For more severe pain, notify us as we can consider alternative medications  -Follow-up with Dr. Albright  He may benefit from use of dedicated nerve related medications    Whenever you are ready, you can also wean off of diclofenac  and gabapentin.    The vitamins can also be discontinued when appropriate.    Return to clinic in 6 months for annual physical examination

## 2025-05-08 ENCOUNTER — OFFICE VISIT (OUTPATIENT)
Dept: INTERNAL MEDICINE CLINIC | Facility: CLINIC | Age: 65
End: 2025-05-08
Payer: COMMERCIAL

## 2025-05-08 VITALS
DIASTOLIC BLOOD PRESSURE: 80 MMHG | BODY MASS INDEX: 34 KG/M2 | TEMPERATURE: 98 F | HEART RATE: 72 BPM | WEIGHT: 226 LBS | SYSTOLIC BLOOD PRESSURE: 130 MMHG

## 2025-05-08 DIAGNOSIS — E78.2 MIXED HYPERLIPIDEMIA: ICD-10-CM

## 2025-05-08 DIAGNOSIS — E55.9 VITAMIN D DEFICIENCY: ICD-10-CM

## 2025-05-08 DIAGNOSIS — R20.2 PARESTHESIAS: ICD-10-CM

## 2025-05-08 DIAGNOSIS — K76.0 NAFLD (NONALCOHOLIC FATTY LIVER DISEASE): ICD-10-CM

## 2025-05-08 DIAGNOSIS — Z76.89 ENCOUNTER FOR WEIGHT MANAGEMENT: Primary | ICD-10-CM

## 2025-05-08 DIAGNOSIS — Z13.29 SCREENING FOR THYROID DISORDER: ICD-10-CM

## 2025-05-08 DIAGNOSIS — R73.03 PREDIABETES: ICD-10-CM

## 2025-05-08 DIAGNOSIS — Z13.0 SCREENING FOR DEFICIENCY ANEMIA: ICD-10-CM

## 2025-05-08 DIAGNOSIS — E53.8 VITAMIN B12 DEFICIENCY: ICD-10-CM

## 2025-05-08 PROCEDURE — 99214 OFFICE O/P EST MOD 30 MIN: CPT | Performed by: INTERNAL MEDICINE

## 2025-05-08 PROCEDURE — 3079F DIAST BP 80-89 MM HG: CPT | Performed by: INTERNAL MEDICINE

## 2025-05-08 PROCEDURE — 3075F SYST BP GE 130 - 139MM HG: CPT | Performed by: INTERNAL MEDICINE

## 2025-05-08 RX ORDER — FLUTICASONE PROPIONATE 50 MCG
1 SPRAY, SUSPENSION (ML) NASAL 2 TIMES DAILY
Qty: 18.2 ML | Refills: 1 | Status: SHIPPED | OUTPATIENT
Start: 2025-05-08

## 2025-05-23 ENCOUNTER — LAB ENCOUNTER (OUTPATIENT)
Dept: LAB | Facility: HOSPITAL | Age: 65
End: 2025-05-23
Attending: INTERNAL MEDICINE
Payer: COMMERCIAL

## 2025-05-23 ENCOUNTER — TELEPHONE (OUTPATIENT)
Dept: INTERNAL MEDICINE CLINIC | Facility: CLINIC | Age: 65
End: 2025-05-23

## 2025-05-23 DIAGNOSIS — E53.8 VITAMIN B12 DEFICIENCY: ICD-10-CM

## 2025-05-23 DIAGNOSIS — Z76.89 ENCOUNTER FOR WEIGHT MANAGEMENT: ICD-10-CM

## 2025-05-23 DIAGNOSIS — Z13.29 SCREENING FOR THYROID DISORDER: ICD-10-CM

## 2025-05-23 DIAGNOSIS — E78.2 MIXED HYPERLIPIDEMIA: ICD-10-CM

## 2025-05-23 DIAGNOSIS — R73.03 PREDIABETES: ICD-10-CM

## 2025-05-23 DIAGNOSIS — Z13.0 SCREENING FOR DEFICIENCY ANEMIA: ICD-10-CM

## 2025-05-23 LAB
ALBUMIN SERPL-MCNC: 4.7 G/DL (ref 3.2–4.8)
ALBUMIN/GLOB SERPL: 1.5 {RATIO} (ref 1–2)
ALP LIVER SERPL-CCNC: 87 U/L (ref 45–117)
ALT SERPL-CCNC: 25 U/L (ref 10–49)
ANION GAP SERPL CALC-SCNC: 5 MMOL/L (ref 0–18)
AST SERPL-CCNC: 20 U/L (ref ?–34)
BASOPHILS # BLD AUTO: 0.03 X10(3) UL (ref 0–0.2)
BASOPHILS NFR BLD AUTO: 0.5 %
BILIRUB SERPL-MCNC: 0.6 MG/DL (ref 0.2–1.1)
BUN BLD-MCNC: 13 MG/DL (ref 9–23)
BUN/CREAT SERPL: 17.3 (ref 10–20)
CALCIUM BLD-MCNC: 9.4 MG/DL (ref 8.7–10.4)
CHLORIDE SERPL-SCNC: 103 MMOL/L (ref 98–112)
CHOLEST SERPL-MCNC: 167 MG/DL (ref ?–200)
CO2 SERPL-SCNC: 27 MMOL/L (ref 21–32)
CREAT BLD-MCNC: 0.75 MG/DL (ref 0.7–1.3)
DEPRECATED RDW RBC AUTO: 44.8 FL (ref 35.1–46.3)
EGFRCR SERPLBLD CKD-EPI 2021: 100 ML/MIN/1.73M2 (ref 60–?)
EOSINOPHIL # BLD AUTO: 0.13 X10(3) UL (ref 0–0.7)
EOSINOPHIL NFR BLD AUTO: 2 %
ERYTHROCYTE [DISTWIDTH] IN BLOOD BY AUTOMATED COUNT: 12.8 % (ref 11–15)
EST. AVERAGE GLUCOSE BLD GHB EST-MCNC: 117 MG/DL (ref 68–126)
FASTING PATIENT LIPID ANSWER: YES
FASTING STATUS PATIENT QL REPORTED: YES
GLOBULIN PLAS-MCNC: 3.1 G/DL (ref 2–3.5)
GLUCOSE BLD-MCNC: 89 MG/DL (ref 70–99)
HBA1C MFR BLD: 5.7 % (ref ?–5.7)
HCT VFR BLD AUTO: 42.6 % (ref 39–53)
HDLC SERPL-MCNC: 50 MG/DL (ref 40–59)
HGB BLD-MCNC: 14.5 G/DL (ref 13–17.5)
IMM GRANULOCYTES # BLD AUTO: 0.02 X10(3) UL (ref 0–1)
IMM GRANULOCYTES NFR BLD: 0.3 %
LDLC SERPL CALC-MCNC: 107 MG/DL (ref ?–100)
LYMPHOCYTES # BLD AUTO: 1.96 X10(3) UL (ref 1–4)
LYMPHOCYTES NFR BLD AUTO: 29.7 %
MCH RBC QN AUTO: 32.3 PG (ref 26–34)
MCHC RBC AUTO-ENTMCNC: 34 G/DL (ref 31–37)
MCV RBC AUTO: 94.9 FL (ref 80–100)
MONOCYTES # BLD AUTO: 0.55 X10(3) UL (ref 0.1–1)
MONOCYTES NFR BLD AUTO: 8.3 %
NEUTROPHILS # BLD AUTO: 3.91 X10 (3) UL (ref 1.5–7.7)
NEUTROPHILS # BLD AUTO: 3.91 X10(3) UL (ref 1.5–7.7)
NEUTROPHILS NFR BLD AUTO: 59.2 %
NONHDLC SERPL-MCNC: 117 MG/DL (ref ?–130)
OSMOLALITY SERPL CALC.SUM OF ELEC: 280 MOSM/KG (ref 275–295)
PLATELET # BLD AUTO: 292 10(3)UL (ref 150–450)
POTASSIUM SERPL-SCNC: 4.5 MMOL/L (ref 3.5–5.1)
PROT SERPL-MCNC: 7.8 G/DL (ref 5.7–8.2)
RBC # BLD AUTO: 4.49 X10(6)UL (ref 3.8–5.8)
SODIUM SERPL-SCNC: 135 MMOL/L (ref 136–145)
TRIGL SERPL-MCNC: 51 MG/DL (ref 30–149)
TSI SER-ACNC: 2.24 UIU/ML (ref 0.55–4.78)
VIT B12 SERPL-MCNC: 528 PG/ML (ref 211–911)
VLDLC SERPL CALC-MCNC: 9 MG/DL (ref 0–30)
WBC # BLD AUTO: 6.6 X10(3) UL (ref 4–11)

## 2025-05-23 PROCEDURE — 84443 ASSAY THYROID STIM HORMONE: CPT

## 2025-05-23 PROCEDURE — 80053 COMPREHEN METABOLIC PANEL: CPT

## 2025-05-23 PROCEDURE — 85025 COMPLETE CBC W/AUTO DIFF WBC: CPT

## 2025-05-23 PROCEDURE — 82607 VITAMIN B-12: CPT

## 2025-05-23 PROCEDURE — 36415 COLL VENOUS BLD VENIPUNCTURE: CPT

## 2025-05-23 PROCEDURE — 83036 HEMOGLOBIN GLYCOSYLATED A1C: CPT

## 2025-05-23 PROCEDURE — 80061 LIPID PANEL: CPT

## 2025-05-23 NOTE — TELEPHONE ENCOUNTER
Please notify patient reviewed blood work from 5/23  - Sugars continue to improve, A1c went from 6.0 down to 5.7%.  Cholesterol is about the same but overall improved since 2023  - The vitamin B12 has settled in the normal range.  Lets continue taking as the current dosage for now    Overall doing very well, should keep up the good work

## (undated) DEVICE — Device

## (undated) DEVICE — LAWSON - GOWN SURG STD XL STL DISP

## (undated) DEVICE — LAWSON - SUT VIC 0 LIGAPAK J207G

## (undated) DEVICE — LAWSON - SUT V-LOC GRN 2-0 GS-22 6IN

## (undated) DEVICE — LAWSON - WATER STL IRR PIC 1000ML

## (undated) NOTE — LETTER
12/13/2024          To Whom It May Concern:    Steve Huber is currently under my medical care and it is my medical opinion that patient's lower extremity neuropathy was as a result of physical activity experienced during his time in the .    If you require additional information please contact our office.        Sincerely,        Masoud Parry MD          Document generated by:  Masoud Parry MD

## (undated) NOTE — LETTER
11/14/2023          To Whom It May Concern:    Willis Rashid is currently under my medical care. I certify that, with regard to COVID-19, Mr. Huber has completed the recommended CDC isolation period and will be able to resume work effective 11/20/2023. If you require additional information please contact our office.             Sincerely,    Lily Saleh MD